# Patient Record
Sex: MALE | Race: WHITE | NOT HISPANIC OR LATINO | Employment: OTHER | URBAN - METROPOLITAN AREA
[De-identification: names, ages, dates, MRNs, and addresses within clinical notes are randomized per-mention and may not be internally consistent; named-entity substitution may affect disease eponyms.]

---

## 2023-05-09 ENCOUNTER — TELEPHONE (OUTPATIENT)
Dept: PRIMARY CARE | Facility: CLINIC | Age: 78
End: 2023-05-09

## 2023-05-09 DIAGNOSIS — E11.9 DIABETES MELLITUS TYPE 2 WITHOUT RETINOPATHY (MULTI): Primary | ICD-10-CM

## 2023-05-09 RX ORDER — METFORMIN HYDROCHLORIDE 500 MG/1
500 TABLET ORAL
Qty: 180 TABLET | Refills: 3 | Status: SHIPPED | OUTPATIENT
Start: 2023-05-09 | End: 2024-05-20

## 2023-05-09 RX ORDER — METFORMIN HYDROCHLORIDE 500 MG/1
500 TABLET ORAL
COMMUNITY
End: 2023-05-09 | Stop reason: SDUPTHER

## 2023-05-22 ENCOUNTER — OFFICE VISIT (OUTPATIENT)
Dept: PRIMARY CARE | Facility: CLINIC | Age: 78
End: 2023-05-22
Payer: MEDICARE

## 2023-05-22 VITALS
WEIGHT: 135.38 LBS | DIASTOLIC BLOOD PRESSURE: 80 MMHG | OXYGEN SATURATION: 94 % | HEIGHT: 72 IN | RESPIRATION RATE: 16 BRPM | TEMPERATURE: 98.4 F | BODY MASS INDEX: 18.34 KG/M2 | SYSTOLIC BLOOD PRESSURE: 135 MMHG | HEART RATE: 89 BPM

## 2023-05-22 DIAGNOSIS — Z00.00 ROUTINE GENERAL MEDICAL EXAMINATION AT A HEALTH CARE FACILITY: ICD-10-CM

## 2023-05-22 DIAGNOSIS — Z00.00 ROUTINE GENERAL MEDICAL EXAMINATION AT HEALTH CARE FACILITY: ICD-10-CM

## 2023-05-22 DIAGNOSIS — H90.3 BILATERAL SENSORINEURAL HEARING LOSS: ICD-10-CM

## 2023-05-22 DIAGNOSIS — E46 PROTEIN-CALORIE MALNUTRITION, UNSPECIFIED SEVERITY (MULTI): ICD-10-CM

## 2023-05-22 DIAGNOSIS — Z12.5 PROSTATE CANCER SCREENING: ICD-10-CM

## 2023-05-22 DIAGNOSIS — K86.1 IDIOPATHIC CHRONIC PANCREATITIS (MULTI): ICD-10-CM

## 2023-05-22 DIAGNOSIS — J44.9 CHRONIC OBSTRUCTIVE PULMONARY DISEASE, UNSPECIFIED COPD TYPE (MULTI): ICD-10-CM

## 2023-05-22 DIAGNOSIS — E11.9 DIABETES MELLITUS TYPE 2 WITHOUT RETINOPATHY (MULTI): Primary | ICD-10-CM

## 2023-05-22 DIAGNOSIS — Z87.891 FORMER SMOKER: ICD-10-CM

## 2023-05-22 DIAGNOSIS — G30.9 ALZHEIMER'S DISEASE, UNSPECIFIED (CODE) (MULTI): ICD-10-CM

## 2023-05-22 PROBLEM — I45.10 RIGHT BUNDLE BRANCH BLOCK (RBBB) ON ELECTROCARDIOGRAPHY: Status: RESOLVED | Noted: 2023-05-22 | Resolved: 2023-05-22

## 2023-05-22 PROBLEM — Z90.411 HISTORY OF PARTIAL PANCREATECTOMY: Status: RESOLVED | Noted: 2023-05-22 | Resolved: 2023-05-22

## 2023-05-22 PROBLEM — H25.13 NUCLEAR SCLEROSIS OF BOTH EYES: Status: ACTIVE | Noted: 2023-05-22

## 2023-05-22 PROBLEM — K85.91: Status: RESOLVED | Noted: 2023-05-22 | Resolved: 2023-05-22

## 2023-05-22 PROBLEM — Z90.49 S/P CHOLECYSTECTOMY: Status: ACTIVE | Noted: 2023-05-22

## 2023-05-22 PROBLEM — Z98.890 HISTORY OF LASER ASSISTED IN SITU KERATOMILEUSIS: Status: RESOLVED | Noted: 2023-05-22 | Resolved: 2023-05-22

## 2023-05-22 PROBLEM — H93.19 TINNITUS: Status: RESOLVED | Noted: 2023-05-22 | Resolved: 2023-05-22

## 2023-05-22 PROBLEM — H52.4 HYPEROPIA OF BOTH EYES WITH ASTIGMATISM AND PRESBYOPIA: Status: ACTIVE | Noted: 2023-05-22

## 2023-05-22 PROBLEM — R59.0 CERVICAL LYMPHADENOPATHY: Status: RESOLVED | Noted: 2023-05-22 | Resolved: 2023-05-22

## 2023-05-22 PROBLEM — E78.5 HYPERLIPIDEMIA: Status: ACTIVE | Noted: 2023-05-22

## 2023-05-22 PROBLEM — G45.9 TIA (TRANSIENT ISCHEMIC ATTACK): Status: ACTIVE | Noted: 2023-05-22

## 2023-05-22 PROBLEM — R41.3 MEMORY IMPAIRMENT OF GRADUAL ONSET: Status: ACTIVE | Noted: 2023-05-22

## 2023-05-22 PROBLEM — K63.5 COLON POLYP: Status: RESOLVED | Noted: 2023-05-22 | Resolved: 2023-05-22

## 2023-05-22 PROBLEM — N40.0 BPH (BENIGN PROSTATIC HYPERPLASIA): Status: ACTIVE | Noted: 2023-05-22

## 2023-05-22 PROBLEM — H52.03 HYPEROPIA OF BOTH EYES WITH ASTIGMATISM AND PRESBYOPIA: Status: ACTIVE | Noted: 2023-05-22

## 2023-05-22 PROBLEM — N52.9 ERECTILE DYSFUNCTION: Status: ACTIVE | Noted: 2023-05-22

## 2023-05-22 PROBLEM — L57.0 MULTIPLE ACTINIC KERATOSES: Status: RESOLVED | Noted: 2023-05-22 | Resolved: 2023-05-22

## 2023-05-22 PROBLEM — L20.81 ATOPIC NEURODERMATITIS: Status: ACTIVE | Noted: 2023-05-22

## 2023-05-22 PROBLEM — H25.013 CORTICAL AGE-RELATED CATARACT OF BOTH EYES: Status: ACTIVE | Noted: 2023-05-22

## 2023-05-22 PROBLEM — R91.8 LUNG MASS: Status: RESOLVED | Noted: 2023-05-22 | Resolved: 2023-05-22

## 2023-05-22 PROBLEM — H52.203 HYPEROPIA OF BOTH EYES WITH ASTIGMATISM AND PRESBYOPIA: Status: ACTIVE | Noted: 2023-05-22

## 2023-05-22 PROCEDURE — 1157F ADVNC CARE PLAN IN RCRD: CPT | Performed by: STUDENT IN AN ORGANIZED HEALTH CARE EDUCATION/TRAINING PROGRAM

## 2023-05-22 PROCEDURE — 1160F RVW MEDS BY RX/DR IN RCRD: CPT | Performed by: STUDENT IN AN ORGANIZED HEALTH CARE EDUCATION/TRAINING PROGRAM

## 2023-05-22 PROCEDURE — 1159F MED LIST DOCD IN RCRD: CPT | Performed by: STUDENT IN AN ORGANIZED HEALTH CARE EDUCATION/TRAINING PROGRAM

## 2023-05-22 PROCEDURE — 1170F FXNL STATUS ASSESSED: CPT | Performed by: STUDENT IN AN ORGANIZED HEALTH CARE EDUCATION/TRAINING PROGRAM

## 2023-05-22 PROCEDURE — 99213 OFFICE O/P EST LOW 20 MIN: CPT | Performed by: STUDENT IN AN ORGANIZED HEALTH CARE EDUCATION/TRAINING PROGRAM

## 2023-05-22 PROCEDURE — 1036F TOBACCO NON-USER: CPT | Performed by: STUDENT IN AN ORGANIZED HEALTH CARE EDUCATION/TRAINING PROGRAM

## 2023-05-22 PROCEDURE — G0439 PPPS, SUBSEQ VISIT: HCPCS | Performed by: STUDENT IN AN ORGANIZED HEALTH CARE EDUCATION/TRAINING PROGRAM

## 2023-05-22 RX ORDER — DONEPEZIL HYDROCHLORIDE 10 MG/1
10 TABLET, FILM COATED ORAL DAILY
COMMUNITY

## 2023-05-22 RX ORDER — ONDANSETRON 4 MG/1
4 TABLET, ORALLY DISINTEGRATING ORAL AS NEEDED
COMMUNITY
Start: 2022-11-29

## 2023-05-22 RX ORDER — TRIAMCINOLONE ACETONIDE 1 MG/G
0.1 CREAM TOPICAL 2 TIMES DAILY
COMMUNITY
Start: 2020-09-23

## 2023-05-22 RX ORDER — MEMANTINE HYDROCHLORIDE 10 MG/1
10 TABLET ORAL 2 TIMES DAILY
COMMUNITY

## 2023-05-22 RX ORDER — ALBUTEROL SULFATE 90 UG/1
2 AEROSOL, METERED RESPIRATORY (INHALATION) EVERY 4 HOURS PRN
COMMUNITY
Start: 2015-01-06

## 2023-05-22 RX ORDER — FLUTICASONE PROPIONATE AND SALMETEROL 250; 50 UG/1; UG/1
1 POWDER RESPIRATORY (INHALATION) EVERY 12 HOURS
COMMUNITY
End: 2024-05-15 | Stop reason: SDUPTHER

## 2023-05-22 SDOH — ECONOMIC STABILITY: INCOME INSECURITY: IN THE LAST 12 MONTHS, WAS THERE A TIME WHEN YOU WERE NOT ABLE TO PAY THE MORTGAGE OR RENT ON TIME?: NO

## 2023-05-22 SDOH — ECONOMIC STABILITY: TRANSPORTATION INSECURITY
IN THE PAST 12 MONTHS, HAS THE LACK OF TRANSPORTATION KEPT YOU FROM MEDICAL APPOINTMENTS OR FROM GETTING MEDICATIONS?: NO

## 2023-05-22 SDOH — ECONOMIC STABILITY: FOOD INSECURITY: WITHIN THE PAST 12 MONTHS, THE FOOD YOU BOUGHT JUST DIDN'T LAST AND YOU DIDN'T HAVE MONEY TO GET MORE.: NEVER TRUE

## 2023-05-22 SDOH — ECONOMIC STABILITY: HOUSING INSECURITY
IN THE LAST 12 MONTHS, WAS THERE A TIME WHEN YOU DID NOT HAVE A STEADY PLACE TO SLEEP OR SLEPT IN A SHELTER (INCLUDING NOW)?: NO

## 2023-05-22 SDOH — ECONOMIC STABILITY: FOOD INSECURITY: WITHIN THE PAST 12 MONTHS, YOU WORRIED THAT YOUR FOOD WOULD RUN OUT BEFORE YOU GOT MONEY TO BUY MORE.: NEVER TRUE

## 2023-05-22 SDOH — ECONOMIC STABILITY: TRANSPORTATION INSECURITY
IN THE PAST 12 MONTHS, HAS LACK OF TRANSPORTATION KEPT YOU FROM MEETINGS, WORK, OR FROM GETTING THINGS NEEDED FOR DAILY LIVING?: NO

## 2023-05-22 SDOH — HEALTH STABILITY: PHYSICAL HEALTH: ON AVERAGE, HOW MANY MINUTES DO YOU ENGAGE IN EXERCISE AT THIS LEVEL?: 0 MIN

## 2023-05-22 SDOH — HEALTH STABILITY: PHYSICAL HEALTH: ON AVERAGE, HOW MANY DAYS PER WEEK DO YOU ENGAGE IN MODERATE TO STRENUOUS EXERCISE (LIKE A BRISK WALK)?: 0 DAYS

## 2023-05-22 ASSESSMENT — SOCIAL DETERMINANTS OF HEALTH (SDOH)
IN A TYPICAL WEEK, HOW MANY TIMES DO YOU TALK ON THE PHONE WITH FAMILY, FRIENDS, OR NEIGHBORS?: NEVER
HOW OFTEN DO YOU ATTEND CHURCH OR RELIGIOUS SERVICES?: NEVER
DO YOU BELONG TO ANY CLUBS OR ORGANIZATIONS SUCH AS CHURCH GROUPS UNIONS, FRATERNAL OR ATHLETIC GROUPS, OR SCHOOL GROUPS?: NO
WITHIN THE LAST YEAR, HAVE TO BEEN RAPED OR FORCED TO HAVE ANY KIND OF SEXUAL ACTIVITY BY YOUR PARTNER OR EX-PARTNER?: NO
HOW OFTEN DO YOU ATTENT MEETINGS OF THE CLUB OR ORGANIZATION YOU BELONG TO?: NEVER
HOW HARD IS IT FOR YOU TO PAY FOR THE VERY BASICS LIKE FOOD, HOUSING, MEDICAL CARE, AND HEATING?: NOT HARD AT ALL
WITHIN THE LAST YEAR, HAVE YOU BEEN AFRAID OF YOUR PARTNER OR EX-PARTNER?: NO
WITHIN THE LAST YEAR, HAVE YOU BEEN HUMILIATED OR EMOTIONALLY ABUSED IN OTHER WAYS BY YOUR PARTNER OR EX-PARTNER?: NO
HOW OFTEN DO YOU GET TOGETHER WITH FRIENDS OR RELATIVES?: ONCE A WEEK
WITHIN THE LAST YEAR, HAVE YOU BEEN KICKED, HIT, SLAPPED, OR OTHERWISE PHYSICALLY HURT BY YOUR PARTNER OR EX-PARTNER?: NO

## 2023-05-22 ASSESSMENT — ENCOUNTER SYMPTOMS
DIZZINESS: 0
FEVER: 0
PALPITATIONS: 0
SHORTNESS OF BREATH: 0
FATIGUE: 0
DIARRHEA: 0
LOSS OF SENSATION IN FEET: 0
FLANK PAIN: 0
MYALGIAS: 0
NAUSEA: 0
LIGHT-HEADEDNESS: 0
HEMATURIA: 0
APPETITE CHANGE: 0
RHINORRHEA: 0
OCCASIONAL FEELINGS OF UNSTEADINESS: 0
VOMITING: 0
ARTHRALGIAS: 0
ABDOMINAL PAIN: 0
CONSTIPATION: 0
SINUS PAIN: 0
DEPRESSION: 0
BLOOD IN STOOL: 0

## 2023-05-22 ASSESSMENT — ACTIVITIES OF DAILY LIVING (ADL)
BATHING: INDEPENDENT
DOING_HOUSEWORK: NEEDS ASSISTANCE
GROCERY_SHOPPING: NEEDS ASSISTANCE
MANAGING_FINANCES: NEEDS ASSISTANCE
DRESSING: INDEPENDENT
TAKING_MEDICATION: NEEDS ASSISTANCE
GROCERY_SHOPPING: NEEDS ASSISTANCE
MANAGING_FINANCES: NEEDS ASSISTANCE
TAKING_MEDICATION: NEEDS ASSISTANCE
DOING_HOUSEWORK: NEEDS ASSISTANCE

## 2023-05-22 ASSESSMENT — LIFESTYLE VARIABLES
AUDIT-C TOTAL SCORE: 8
HOW OFTEN DO YOU HAVE A DRINK CONTAINING ALCOHOL: 4 OR MORE TIMES A WEEK
HOW OFTEN DO YOU HAVE SIX OR MORE DRINKS ON ONE OCCASION: DAILY OR ALMOST DAILY
HOW MANY STANDARD DRINKS CONTAINING ALCOHOL DO YOU HAVE ON A TYPICAL DAY: 1 OR 2
SKIP TO QUESTIONS 9-10: 0

## 2023-05-22 ASSESSMENT — PATIENT HEALTH QUESTIONNAIRE - PHQ9
SUM OF ALL RESPONSES TO PHQ9 QUESTIONS 1 AND 2: 0
2. FEELING DOWN, DEPRESSED OR HOPELESS: NOT AT ALL
1. LITTLE INTEREST OR PLEASURE IN DOING THINGS: NOT AT ALL

## 2023-05-22 NOTE — PROGRESS NOTES
Subjective   Sherman Sheffield is a 77 y.o. male who is here for a routine exam.  Active Problem List      Comprehensive Medical/Surgical/Social/Family History  Past Medical History:   Diagnosis Date    Cervical lymphadenopathy 05/22/2023    Chronic obstructive pulmonary disease, unspecified (CMS/HCC) 02/26/2021    COPD (chronic obstructive pulmonary disease)    Localized edema 02/10/2016    Bilateral leg edema    Muscle weakness (generalized)     Muscle weakness    Other conditions influencing health status     Pancreatitis    Other disorders of electrolyte and fluid balance, not elsewhere classified     Electrolyte and fluid disorder    Other malaise     Debility    Other nonspecific abnormal finding of lung field 09/13/2017    Lung mass    Personal history of other diseases of the digestive system     History of acute pancreatitis    Personal history of other diseases of the digestive system     History of esophageal reflux    Pleural effusion, not elsewhere classified     Bilateral pleural effusion    Prediabetes 06/03/2016    Prediabetes    Unspecified atrial flutter (CMS/HCC)     Atrial flutter    Unspecified injury of unspecified kidney, initial encounter     Kidney injury     Past Surgical History:   Procedure Laterality Date    CHOLECYSTECTOMY  10/21/2013    Cholecystectomy    OTHER SURGICAL HISTORY  10/21/2013    Biopsy Of The Prostate Incisional    OTHER SURGICAL HISTORY  02/10/2016    Closed Treatment Of A Subtrochanteric Fracture    OTHER SURGICAL HISTORY  02/10/2016    Pancreatic Surgery    US GUIDED ABSCESS DRAIN  3/19/2013    US GUIDED ABSCESS DRAIN 3/19/2013 [facility] [place] [facility]PATIENT LEGACY    US GUIDED ABSCESS DRAIN  4/2/2013    US GUIDED ABSCESS DRAIN 4/2/2013 [facility] [place] INPATIENT LEGACY    US GUIDED ABSCESS DRAIN  4/3/2013    US GUIDED ABSCESS DRAIN 4/3/2013 [facility] [place] INPATIENT LEGACY    US GUIDED PERCUTANEOUS PERITONEAL OR RETROPERITONEAL FLUID COLLECTION DRAINAGE  3/19/2013    US GUIDED PERCUTANEOUS PERITONEAL OR  RETROPERITONEAL FLUID COLLECTION DRAINAGE 3/19/2013 [facility] [place]    US GUIDED PERCUTANEOUS PERITONEAL OR RETROPERITONEAL FLUID COLLECTION DRAINAGE  4/2/2013    US GUIDED PERCUTANEOUS PERITONEAL OR RETROPERITONEAL FLUID COLLECTION DRAINAGE 4/2/2013 [facility] [place] [facility]     Social History     Social History Narrative    Not on file         Allergies and Medications  Patient has no known allergies.  Current Outpatient Medications on File Prior to Visit   Medication Sig Dispense Refill    albuterol 90 mcg/actuation inhaler Inhale 2 puffs every 4 hours if needed for shortness of breath.      donepezil (Aricept) 10 mg tablet Take 1 tablet (10 mg) by mouth once daily.      fluticasone propion-salmeteroL (Advair Diskus) 250-50 mcg/dose diskus inhaler Inhale 1 puff every 12 hours.      memantine (Namenda) 10 mg tablet Take 1 tablet (10 mg) by mouth 2 times a day.      metFORMIN (Glucophage) 500 mg tablet Take 1 tablet (500 mg) by mouth 2 times a day with meals. 180 tablet 3    ondansetron ODT (Zofran-ODT) 4 mg disintegrating tablet Take 1 tablet (4 mg) by mouth if needed for nausea.      triamcinolone (Kenalog) 0.1 % cream Apply 0.1 Applications topically 2 times a day.       No current facility-administered medications on file prior to visit.       Lifestyle  Diet: well balanced, avoids sugars  Exercise: No formal exercise, has plans to begin walking program  Tobacco: None currently, quit smoking 5 years ago  Alcohol:  One vodka drink per day    Colorectal Screening: Not indicated  Nocturia: Denies    Lung Ca screening:  Used to follow with pulmonology  Unsure if prior low dose CT has been completed    Review of Systems   Constitutional:  Negative for appetite change, fatigue and fever.   HENT:  Negative for ear discharge, ear pain, hearing loss, postnasal drip, rhinorrhea and sinus pain.    Eyes:  Negative for visual disturbance.   Respiratory:  Negative for shortness of breath.    Cardiovascular:  Negative for  "chest pain, palpitations and leg swelling.   Gastrointestinal:  Negative for abdominal pain, blood in stool, constipation, diarrhea, nausea and vomiting.   Genitourinary:  Negative for flank pain and hematuria.   Musculoskeletal:  Negative for arthralgias and myalgias.   Skin:  Negative for rash.   Neurological:  Negative for dizziness and light-headedness.   All other systems reviewed and are negative.      Objective   /80 (BP Location: Right arm, Patient Position: Sitting)   Pulse 89   Temp 36.9 °C (98.4 °F) (Temporal)   Resp 16   Ht 1.816 m (5' 11.5\")   Wt 61.4 kg (135 lb 6 oz)   SpO2 94%   BMI 18.62 kg/m²     Physical Exam  Vitals reviewed.   Constitutional:       General: He is not in acute distress.     Appearance: Normal appearance. He is normal weight. He is not toxic-appearing.   HENT:      Head: Normocephalic and atraumatic.      Right Ear: Tympanic membrane and ear canal normal.      Left Ear: Tympanic membrane and ear canal normal.      Nose: Nose normal. No congestion or rhinorrhea.      Mouth/Throat:      Mouth: Mucous membranes are dry.   Eyes:      General: No scleral icterus.     Extraocular Movements: Extraocular movements intact.      Conjunctiva/sclera: Conjunctivae normal.      Pupils: Pupils are equal, round, and reactive to light.   Cardiovascular:      Rate and Rhythm: Normal rate and regular rhythm.      Heart sounds: No murmur heard.     No friction rub. No gallop.   Pulmonary:      Effort: Pulmonary effort is normal. No respiratory distress.      Breath sounds: Normal breath sounds. No wheezing, rhonchi or rales.   Abdominal:      General: Abdomen is flat. There is no distension.      Palpations: Abdomen is soft.      Tenderness: There is no abdominal tenderness. There is no guarding.   Musculoskeletal:         General: Normal range of motion.      Cervical back: Normal range of motion and neck supple.      Right lower leg: No edema.      Left lower leg: No edema. "   Lymphadenopathy:      Cervical: No cervical adenopathy.   Skin:     General: Skin is warm and dry.   Neurological:      General: No focal deficit present.      Mental Status: He is alert and oriented to person, place, and time.   Psychiatric:         Mood and Affect: Mood normal.         Behavior: Behavior normal.         Assessment/Plan   Problem List Items Addressed This Visit          Nervous    Alzheimer's disease, unspecified (CODE) (CMS/[facility])       Respiratory    COPD (chronic obstructive pulmonary disease) (CMS/[facility])       Digestive    Chronic pancreatitis (CMS/[facility])       Endocrine/Metabolic    Diabetes mellitus type 2 without retinopathy (CMS/[facility]) - Primary    Relevant Orders    CBC    Comprehensive Metabolic Panel    Lipid Panel    Hemoglobin A1C    Protein-calorie malnutrition, unspecified severity (CMS/[facility])       Other    Former smoker    Routine general medical examination at a health care facility    Relevant Orders    CBC    Bilateral sensorineural hearing loss    Relevant Orders    Referral to Audiology     Other Visit Diagnoses       Prostate cancer screening        Relevant Orders    Prostate Spec.Ag,Screen    Routine general medical examination at health care facility              Reviewed Social Determinants of health with patient, discussed healthy lifestyle including 150 minutes of physical activity per week  Ordered/Reviewed baseline labwork -CBC, CMP, Lipid Panel  Immunizations Up-to-Date  Colonoscopy not indicated due to comorbidities    Continue follow-up with neurology, continue medications as prescribed  Discussed healthy lifestyle as above.    Follow-up in 1 year or sooner if new concerns arise.

## 2023-05-31 ENCOUNTER — LAB (OUTPATIENT)
Dept: LAB | Facility: LAB | Age: 78
End: 2023-05-31
Payer: MEDICARE

## 2023-05-31 DIAGNOSIS — Z00.00 ROUTINE GENERAL MEDICAL EXAMINATION AT A HEALTH CARE FACILITY: ICD-10-CM

## 2023-05-31 DIAGNOSIS — Z12.5 PROSTATE CANCER SCREENING: ICD-10-CM

## 2023-05-31 DIAGNOSIS — E11.9 DIABETES MELLITUS TYPE 2 WITHOUT RETINOPATHY (MULTI): ICD-10-CM

## 2023-05-31 LAB
ALANINE AMINOTRANSFERASE (SGPT) (U/L) IN SER/PLAS: 11 U/L (ref 10–52)
ALBUMIN (G/DL) IN SER/PLAS: 4.1 G/DL (ref 3.4–5)
ALKALINE PHOSPHATASE (U/L) IN SER/PLAS: 72 U/L (ref 33–136)
ANION GAP IN SER/PLAS: 12 MMOL/L (ref 10–20)
ASPARTATE AMINOTRANSFERASE (SGOT) (U/L) IN SER/PLAS: 16 U/L (ref 9–39)
BILIRUBIN TOTAL (MG/DL) IN SER/PLAS: 1.2 MG/DL (ref 0–1.2)
CALCIUM (MG/DL) IN SER/PLAS: 9.2 MG/DL (ref 8.6–10.3)
CARBON DIOXIDE, TOTAL (MMOL/L) IN SER/PLAS: 28 MMOL/L (ref 21–32)
CHLORIDE (MMOL/L) IN SER/PLAS: 105 MMOL/L (ref 98–107)
CHOLESTEROL (MG/DL) IN SER/PLAS: 145 MG/DL (ref 0–199)
CHOLESTEROL IN HDL (MG/DL) IN SER/PLAS: 45.7 MG/DL
CHOLESTEROL/HDL RATIO: 3.2
CREATININE (MG/DL) IN SER/PLAS: 0.98 MG/DL (ref 0.5–1.3)
ERYTHROCYTE DISTRIBUTION WIDTH (RATIO) BY AUTOMATED COUNT: 13.4 % (ref 11.5–14.5)
ERYTHROCYTE MEAN CORPUSCULAR HEMOGLOBIN CONCENTRATION (G/DL) BY AUTOMATED: 32.9 G/DL (ref 32–36)
ERYTHROCYTE MEAN CORPUSCULAR VOLUME (FL) BY AUTOMATED COUNT: 98 FL (ref 80–100)
ERYTHROCYTES (10*6/UL) IN BLOOD BY AUTOMATED COUNT: 4.25 X10E12/L (ref 4.5–5.9)
ESTIMATED AVERAGE GLUCOSE FOR HBA1C: 120 MG/DL
GFR MALE: 78 ML/MIN/1.73M2
GLUCOSE (MG/DL) IN SER/PLAS: 149 MG/DL (ref 74–99)
HEMATOCRIT (%) IN BLOOD BY AUTOMATED COUNT: 41.7 % (ref 41–52)
HEMOGLOBIN (G/DL) IN BLOOD: 13.7 G/DL (ref 13.5–17.5)
HEMOGLOBIN A1C/HEMOGLOBIN TOTAL IN BLOOD: 5.8 %
LDL: 84 MG/DL (ref 0–99)
LEUKOCYTES (10*3/UL) IN BLOOD BY AUTOMATED COUNT: 5.1 X10E9/L (ref 4.4–11.3)
PLATELETS (10*3/UL) IN BLOOD AUTOMATED COUNT: 182 X10E9/L (ref 150–450)
POTASSIUM (MMOL/L) IN SER/PLAS: 4 MMOL/L (ref 3.5–5.3)
PROSTATE SPECIFIC ANTIGEN,SCREEN: 0.81 NG/ML (ref 0–4)
PROTEIN TOTAL: 7 G/DL (ref 6.4–8.2)
SODIUM (MMOL/L) IN SER/PLAS: 141 MMOL/L (ref 136–145)
TRIGLYCERIDE (MG/DL) IN SER/PLAS: 75 MG/DL (ref 0–149)
UREA NITROGEN (MG/DL) IN SER/PLAS: 28 MG/DL (ref 6–23)
VLDL: 15 MG/DL (ref 0–40)

## 2023-05-31 PROCEDURE — 36415 COLL VENOUS BLD VENIPUNCTURE: CPT

## 2023-05-31 PROCEDURE — 80061 LIPID PANEL: CPT

## 2023-05-31 PROCEDURE — 84153 ASSAY OF PSA TOTAL: CPT

## 2023-05-31 PROCEDURE — 83036 HEMOGLOBIN GLYCOSYLATED A1C: CPT

## 2023-05-31 PROCEDURE — 80053 COMPREHEN METABOLIC PANEL: CPT

## 2023-05-31 PROCEDURE — 85027 COMPLETE CBC AUTOMATED: CPT

## 2023-06-01 ENCOUNTER — TELEPHONE (OUTPATIENT)
Dept: PRIMARY CARE | Facility: CLINIC | Age: 78
End: 2023-06-01

## 2023-06-01 NOTE — TELEPHONE ENCOUNTER
----- Message from Guanakito Kaur DO sent at 5/31/2023 10:52 PM EDT -----  Please add the hemoglobin A1c is 5.8%, significantly improved from previous which were all in the sevens.    No changes to medications at this time.    Follow-up as needed concerns arise.

## 2023-11-03 ENCOUNTER — APPOINTMENT (OUTPATIENT)
Dept: PULMONOLOGY | Facility: CLINIC | Age: 78
End: 2023-11-03

## 2024-01-11 ENCOUNTER — APPOINTMENT (OUTPATIENT)
Dept: PULMONOLOGY | Facility: CLINIC | Age: 79
End: 2024-01-11
Payer: MEDICARE

## 2024-02-22 ENCOUNTER — OFFICE VISIT (OUTPATIENT)
Dept: PULMONOLOGY | Facility: CLINIC | Age: 79
End: 2024-02-22
Payer: MEDICARE

## 2024-02-22 VITALS
WEIGHT: 139 LBS | HEART RATE: 67 BPM | DIASTOLIC BLOOD PRESSURE: 65 MMHG | HEIGHT: 72 IN | OXYGEN SATURATION: 96 % | TEMPERATURE: 97.9 F | SYSTOLIC BLOOD PRESSURE: 112 MMHG | RESPIRATION RATE: 16 BRPM | BODY MASS INDEX: 18.83 KG/M2

## 2024-02-22 DIAGNOSIS — J41.8 MIXED SIMPLE AND MUCOPURULENT CHRONIC BRONCHITIS (MULTI): Primary | ICD-10-CM

## 2024-02-22 DIAGNOSIS — R09.82 POST-NASAL DRIP: ICD-10-CM

## 2024-02-22 PROCEDURE — 99214 OFFICE O/P EST MOD 30 MIN: CPT | Performed by: INTERNAL MEDICINE

## 2024-02-22 PROCEDURE — 1157F ADVNC CARE PLAN IN RCRD: CPT | Performed by: INTERNAL MEDICINE

## 2024-02-22 PROCEDURE — 1036F TOBACCO NON-USER: CPT | Performed by: INTERNAL MEDICINE

## 2024-02-22 PROCEDURE — 1159F MED LIST DOCD IN RCRD: CPT | Performed by: INTERNAL MEDICINE

## 2024-02-22 RX ORDER — FLUTICASONE PROPIONATE 50 MCG
2 SPRAY, SUSPENSION (ML) NASAL DAILY
Qty: 16 G | Refills: 2 | Status: SHIPPED | OUTPATIENT
Start: 2024-02-22 | End: 2024-08-20

## 2024-02-22 ASSESSMENT — PATIENT HEALTH QUESTIONNAIRE - PHQ9
2. FEELING DOWN, DEPRESSED OR HOPELESS: NOT AT ALL
SUM OF ALL RESPONSES TO PHQ9 QUESTIONS 1 AND 2: 0
1. LITTLE INTEREST OR PLEASURE IN DOING THINGS: NOT AT ALL

## 2024-02-22 ASSESSMENT — ENCOUNTER SYMPTOMS
OCCASIONAL FEELINGS OF UNSTEADINESS: 0
DEPRESSION: 0
LOSS OF SENSATION IN FEET: 0

## 2024-02-22 ASSESSMENT — COLUMBIA-SUICIDE SEVERITY RATING SCALE - C-SSRS
2. HAVE YOU ACTUALLY HAD ANY THOUGHTS OF KILLING YOURSELF?: NO
6. HAVE YOU EVER DONE ANYTHING, STARTED TO DO ANYTHING, OR PREPARED TO DO ANYTHING TO END YOUR LIFE?: NO
1. IN THE PAST MONTH, HAVE YOU WISHED YOU WERE DEAD OR WISHED YOU COULD GO TO SLEEP AND NOT WAKE UP?: NO

## 2024-02-26 NOTE — PROGRESS NOTES
Department of Medicine  Division of Pulmonary, Critical Care, and Sleep Medicine  Follow-Up Visit  [facility] - [address], [address]    Physician HPI:  Mr Sheffield is an 78-year-old man with past medical history significant for nicotine dependence more than 60-pack-year history, severe COPD history of alcohol use and chronic pancreatitis who presented to clinic today for follow up.      Follow up 05/22/2019  No acute respiratory symptoms today. He has been using Advair once per day (again). No frequent need of Albuterol. He had a recent history of TIA and is following up with Neurology. Repeat chest imaging showed stable upper lobes linear scarring.      Follow up 11/11/2019  Sherman has no acute respiratory symptoms today. No interval hx of COPD exacerbations.He reports cigarette smoking about 1/2 PPD. He has been using Advair twice per day. He did not have to use ALbuterol frequently. He is planning to get flu vaccine this season.      Follow up 6/22/2020  Sherman reports stable respiratory status since last visit. He has chronic dyspnea on exertion that has not changed in the past 6 months. No acute cough or purulent sputum. Using Advair once per day again.     Follow up 02/22/2024:  Sherman presented to the clinic today accompanied by his wife. He was last seen in the office in 2019. Reports gradual decline in functional status and dyspnea on exertion over the past 2-3 years. No interval history of acute copd exacerbation  that required systemic corticosteroids or hospital admission. He is following up with Neurology for vascular dementia. Reports nasal dripping and frequent throat clearing. No acute fevers, purulent sputum, chest pain or hemoptysis.     Immunization History   Administered Date(s) Administered    Moderna SARS-CoV-2 Vaccination 03/01/2021, 03/29/2021, 12/01/2021       Current Outpatient Medications   Medication Instructions    albuterol 90 mcg/actuation inhaler 2 puffs, inhalation, Every 4 hours PRN     "donepezil (ARICEPT) 10 mg, oral, Daily    fluticasone (Flonase) 50 mcg/actuation nasal spray 2 sprays, Each Nostril, Daily, Shake gently. Before first use, prime pump. After use, clean tip and replace cap.    fluticasone propion-salmeteroL (Advair Diskus) 250-50 mcg/dose diskus inhaler 1 puff, inhalation, Every 12 hours    memantine (NAMENDA) 10 mg, oral, 2 times daily    metFORMIN (GLUCOPHAGE) 500 mg, oral, 2 times daily with meals    ondansetron ODT (ZOFRAN-ODT) 4 mg, oral, As needed    triamcinolone (Kenalog) 0.1 % cream 0.1 Applications, Topical, 2 times daily        Allergies:  No Known Allergies       Visit Vitals  /65 (BP Location: Left arm, Patient Position: Sitting, BP Cuff Size: Adult)   Pulse 67   Temp 36.6 °C (97.9 °F) (Temporal)   Resp 16   Ht 1.816 m (5' 11.5\")   Wt 63 kg (139 lb)   SpO2 96%   BMI 19.12 kg/m²   Smoking Status Former   BSA 1.78 m²        Physical Exam:  Constitutional: Alert and in no acute distress. Well developed, well nourished.   Head and Face: Head and face: Normal. Palpation of the face and sinuses: Normal.   Ears, Nose, Mouth, and Throat: Oropharynx: Normal.   Pulmonary: Chest: Normal to inspection. Respiratory effort: No increased work of breathing or signs of respiratory distress. Auscultation of lungs: Clear to auscultation bilaterally.   Cardiovascular: Heart rate and rhythm were normal, normal S1 and S2, no gallops, no murmurs and no pericardial rub. No peripheral edema.   Lymphatic: No lymphadenopathy.   Musculoskeletal: No clubbing or cyanosis of the fingernails.   Skin: Normal skin color and pigmentation, normal skin turgor, and no rash.   Psychiatric: Judgment and insight: Intact. Alert and oriented to person, place and time. Mood and affect: Normal.           Chest Radiograph     XR chest 2 views     Narrative  PERFORMED AT [facility] LOCATION:[facility]  Findings:    Osseous structures intact. Cardiopericardial silhouette normal. Pulmonary " vasculature  normal. Aorta calcified and tortuous. Lungs hyperexpanded with diaphragms flattened  and retrosternal clear space increase. Thin oblique band of increased opacity coursing  from right suprahilar region into right upper lobe.    Impression  Emphysema.    Right upper lobe scarring versus subsegmental atelectatic change.  Report reported and signed by Kamari Wood on 04/25/2023 1440      XR chest 1 view 11/28/2022    Narrative  STUDY:  Chest Radiograph;  11/28/2022 5:52 PM.    INDICATION:  Chest pain.    COMPARISON:  11/27/2019 CT Chest Low Dose For Lung, 04/03/2019 Chest PA/AP Lateral.    ACCESSION NUMBER(S):  [id number]    ORDERING CLINICIAN:  KP AMARAL DO    TECHNIQUE:  Frontal chest was obtained at 1848 hours.    FINDINGS:    Emphysematous changes.  RIGHT upper lobe subsegmental atelectasis  grossly unchanged from prior.  Nodule identified on 11/27/19 CT  examination RIGHT upper lobe is not readily apparent.  No pleural  effusion.  No pneumothorax.  Heart size normal.    Impression  Emphysematous changes.    RIGHT upper lobe subsegmental atelectasis grossly unchanged from  prior.      Signed by Raul Torrez MD      Echocardiogram     No results found for this or any previous visit from the past 365 days.       Chest CT Scan   CT LUNG SCREENING LOW DOSE 11/27/2019    Narrative  MRN: [id number]  Patient Name: YISSEL GAVIN    STUDY:   CT CHEST LOW DOSE FOR LUNG SCREENING WO CONTRAST; 11/27/2019 10:34  am    INDICATION:  lung cancer screening.    COMPARISON:  CT chest dated 09/08/2017    ACCESSION NUMBER(S):  [id number]    ORDERING CLINICIAN:  LAKE TALAMANTES    TECHNIQUE:  Helical data acquisition of the chest was obtained without IV  contrast material.  Images were reformatted in axial, coronal, and  sagittal planes.    FINDINGS:  LUNGS AND AIRWAYS:  The trachea and central airways are patent. No endobronchial lesion.  There is severe centrilobular emphysematous changes the  bilateral  lungs. There is apical pleuroparenchymal scarring. There is linear  densities in the right upper lobe with mild surrounding ground-glass  opacity measuring up to 4 mm in its greatest thickness (axial image  109/335), improved compared to CT dated 09/08/2017 and represent  scarring/fibrotic changes. Band like atelectasis/scarring in the  right middle, right lower lobe and lingular lobe. No focal  consolidation, pleural effusion or pneumothorax.  1 cm nodule in the right upper lobe (axial image 161/335) is stable  compared to CT dated 09/08/2017, likely benign. There are multiple  other nodules in the bilateral lungs, stable compared to CT dated  09/08/2017. For example, a bulky rachid density in the right upper  lobe (axial image 62/335), 4 mm perifissural nodule along the right  minor fissure (axial image 198/335), 5 mm nodule in the right middle  lobe (axial image 267/335), a 7 mm pleural-based nodular density in  the right lower lobe (axial image 209/335), 5 mm nodule in the right  lower lobe (axial image 226/335), and 3 mm subpleural nodule in the  left upper lobe (axial image 89/335). Other smaller stable punctate  nodular densities are annotated on PACS.    MEDIASTINUM AND JOSE ANGEL, LOWER NECK AND AXILLA:  The visualized thyroid gland is within normal limits.  No evidence of thoracic lymphadenopathy by CT criteria.  There is small hiatal hernia.    HEART AND VESSELS:  The thoracic aorta is of normal course and caliber with severe  vascular calcifications.  Main pulmonary artery and its branches are normal in caliber.  Severe coronary artery calcifications are present.The study is not  optimized for evaluation of coronary arteries.  The cardiac chambers are not enlarged.  Trace pericardial effusion is seen.    UPPER ABDOMEN:  4 cm area of hypodensity in the segment 4A of the liver is unchanged  compared to CT dated 09/08/2017. There is again postsurgical change  from pancreatic body resection. 2.4 cm  hypodense area in the  pancreatic tail is similar compared to CT dated 09/08/2017 and  decreased compared to CT dated 04/19/2017, which may represent  pseudocyst. Small amount of perisplenic fluid is again seen.    CHEST WALL AND OSSEOUS STRUCTURES:  There are no suspicious osseous lesions. Multilevel degenerative  changes are present  There is diffuse osteopenia.    Impression  1. Linear densities with architectural distortion in the right upper  lobe measuring 4 mm in its greatest thickness, improved compared to  CT dated 09/08/2017 and likely represent residual scarring/fibrotic  changes but attention on follow-up is recommended.  2. Stable nodules in the bilateral lungs measuring up to 1 cm.  Comment follow-up chest CT in 12 months.  3. Moderate to severe upper lung predominant emphysema.  4. Severe coronary artery calcifications.  5. Redemonstration of postsurgical change in the pancreatic body and  2.4 cm hypodense area in the pancreatic tail, may represent  pseudocyst.  6. Unchanged small amount of perisplenic fluid.  7. Stable hypodense area in the segment 4A of the liver      LUNG RADS CATEGORY:  Lung-RADS 2 S(Benign Appearance or Behavior): Continue with annual  screening in 12 months recommended as per American College of  Radiology Guidelines Lung-RADS Version 1.1S modifier is for severe  coronary artery disease, although this study is not optimized to  evaluate coronary artery.    I personally reviewed the images/study and I agree with the findings  as stated. This study was interpreted at [facility], [place], [place].        Laboratory Studies     Lab Results   Component Value Date    WBC 5.1 05/31/2023    HGB 13.7 05/31/2023    HCT 41.7 05/31/2023    MCV 98 05/31/2023     05/31/2023      Lab Results   Component Value Date    GLUCOSE 149 (H) 05/31/2023    CALCIUM 9.2 05/31/2023     05/31/2023    K 4.0 05/31/2023    CO2 28 05/31/2023     05/31/2023     BUN 28 (H) 05/31/2023    CREATININE 0.98 05/31/2023      Lab Results   Component Value Date    ALT 11 05/31/2023    AST 16 05/31/2023    ALKPHOS 72 05/31/2023    BILITOT 1.2 05/31/2023        Pulmonary Function Test         Assessment and Plan / Recommendations     Mr Whitaker is an 78-year-old man with:     1. COPD GOLD spirometry III with mMRC II  2. Nicotine dependence in remission   3. Lung scarring/atelectasis in upper lobes  4. Dyspnea on exertion  5. Dementia   6. Post nasal drip    Plan:  1. Repeat PFTs and 6MWT  2. Continue on current inhaler regimen  3. Fluticasone nasal spray for PND  4. Up to date with Influenza and Pneumococcal vaccines. Advised to get RSV and COVID boosters.   5. Pulmonary rehab - after PFTs are done    Follow up in 4 months.       Please excuse any misspellings or unintended errors related to the Dragon speech recognition software used to dictate this note.         Jaquelin Mcintyre MD  02/22/2024

## 2024-05-10 ENCOUNTER — HOSPITAL ENCOUNTER (OUTPATIENT)
Dept: RESPIRATORY THERAPY | Facility: HOSPITAL | Age: 79
Discharge: HOME | End: 2024-05-10
Payer: MEDICARE

## 2024-05-10 DIAGNOSIS — J41.8 MIXED SIMPLE AND MUCOPURULENT CHRONIC BRONCHITIS (MULTI): ICD-10-CM

## 2024-05-10 LAB
MGC ASCENT PFT - FEV1 - POST: 0.92
MGC ASCENT PFT - FEV1 - POST: 0.92
MGC ASCENT PFT - FEV1 - PRE: 0.72
MGC ASCENT PFT - FEV1 - PRE: 0.72
MGC ASCENT PFT - FEV1 - PREDICTED: 2.92
MGC ASCENT PFT - FEV1 - PREDICTED: 2.92
MGC ASCENT PFT - FVC - POST: 2.08
MGC ASCENT PFT - FVC - POST: 2.08
MGC ASCENT PFT - FVC - PRE: 1.65
MGC ASCENT PFT - FVC - PRE: 1.65
MGC ASCENT PFT - FVC - PREDICTED: 4.01
MGC ASCENT PFT - FVC - PREDICTED: 4.01

## 2024-05-10 PROCEDURE — 94726 PLETHYSMOGRAPHY LUNG VOLUMES: CPT

## 2024-05-10 PROCEDURE — 94618 PULMONARY STRESS TESTING: CPT

## 2024-05-15 ENCOUNTER — TELEPHONE (OUTPATIENT)
Dept: PULMONOLOGY | Facility: CLINIC | Age: 79
End: 2024-05-15
Payer: MEDICARE

## 2024-05-15 DIAGNOSIS — J41.8 MIXED SIMPLE AND MUCOPURULENT CHRONIC BRONCHITIS (MULTI): Primary | ICD-10-CM

## 2024-05-15 RX ORDER — FLUTICASONE PROPIONATE AND SALMETEROL 250; 50 UG/1; UG/1
1 POWDER RESPIRATORY (INHALATION) EVERY 12 HOURS
Qty: 60 EACH | Refills: 6 | Status: SHIPPED | OUTPATIENT
Start: 2024-05-15

## 2024-05-15 NOTE — TELEPHONE ENCOUNTER
Called pt back and left a detailed msg. On VM about below message.    Stated that to call the office if they have any furture questions.    No issues or concerns at moment.

## 2024-05-15 NOTE — TELEPHONE ENCOUNTER
Dr. Dale,    Pt wife called an stated that he got his PFT's testing done last week.    Asking for a refill on the fluticasone propion-salmeterol or unless if there is another inhaler you can suggest he start using after you review his PFT results.    Milly is [facility] in [facility].    Please advise.

## 2024-05-17 DIAGNOSIS — E11.9 DIABETES MELLITUS TYPE 2 WITHOUT RETINOPATHY (MULTI): Primary | ICD-10-CM

## 2024-05-20 RX ORDER — METFORMIN HYDROCHLORIDE 500 MG/1
TABLET ORAL
Qty: 180 TABLET | Refills: 0 | Status: SHIPPED | OUTPATIENT
Start: 2024-05-20

## 2024-06-03 ENCOUNTER — OFFICE VISIT (OUTPATIENT)
Dept: PRIMARY CARE | Facility: CLINIC | Age: 79
End: 2024-06-03
Payer: MEDICARE

## 2024-06-03 VITALS
TEMPERATURE: 98.6 F | DIASTOLIC BLOOD PRESSURE: 68 MMHG | HEIGHT: 71 IN | OXYGEN SATURATION: 93 % | HEART RATE: 80 BPM | RESPIRATION RATE: 16 BRPM | BODY MASS INDEX: 19.06 KG/M2 | SYSTOLIC BLOOD PRESSURE: 131 MMHG | WEIGHT: 136.13 LBS

## 2024-06-03 DIAGNOSIS — E55.9 VITAMIN D DEFICIENCY: ICD-10-CM

## 2024-06-03 DIAGNOSIS — E53.8 VITAMIN B12 DEFICIENCY: ICD-10-CM

## 2024-06-03 DIAGNOSIS — Z00.00 ROUTINE GENERAL MEDICAL EXAMINATION AT HEALTH CARE FACILITY: Primary | ICD-10-CM

## 2024-06-03 DIAGNOSIS — M75.22 BILATERAL BICEPS TENDONITIS: ICD-10-CM

## 2024-06-03 DIAGNOSIS — M16.0 BILATERAL HIP JOINT ARTHRITIS: ICD-10-CM

## 2024-06-03 DIAGNOSIS — M12.812 ROTATOR CUFF ARTHROPATHY OF BOTH SHOULDERS: ICD-10-CM

## 2024-06-03 DIAGNOSIS — E11.9 DIABETES MELLITUS TYPE 2 WITHOUT RETINOPATHY (MULTI): ICD-10-CM

## 2024-06-03 DIAGNOSIS — M75.21 BILATERAL BICEPS TENDONITIS: ICD-10-CM

## 2024-06-03 DIAGNOSIS — M12.811 ROTATOR CUFF ARTHROPATHY OF BOTH SHOULDERS: ICD-10-CM

## 2024-06-03 PROCEDURE — 1159F MED LIST DOCD IN RCRD: CPT | Performed by: STUDENT IN AN ORGANIZED HEALTH CARE EDUCATION/TRAINING PROGRAM

## 2024-06-03 PROCEDURE — 99397 PER PM REEVAL EST PAT 65+ YR: CPT | Performed by: STUDENT IN AN ORGANIZED HEALTH CARE EDUCATION/TRAINING PROGRAM

## 2024-06-03 PROCEDURE — 1036F TOBACCO NON-USER: CPT | Performed by: STUDENT IN AN ORGANIZED HEALTH CARE EDUCATION/TRAINING PROGRAM

## 2024-06-03 PROCEDURE — 1157F ADVNC CARE PLAN IN RCRD: CPT | Performed by: STUDENT IN AN ORGANIZED HEALTH CARE EDUCATION/TRAINING PROGRAM

## 2024-06-03 PROCEDURE — G0439 PPPS, SUBSEQ VISIT: HCPCS | Performed by: STUDENT IN AN ORGANIZED HEALTH CARE EDUCATION/TRAINING PROGRAM

## 2024-06-03 PROCEDURE — 1123F ACP DISCUSS/DSCN MKR DOCD: CPT | Performed by: STUDENT IN AN ORGANIZED HEALTH CARE EDUCATION/TRAINING PROGRAM

## 2024-06-03 PROCEDURE — 1170F FXNL STATUS ASSESSED: CPT | Performed by: STUDENT IN AN ORGANIZED HEALTH CARE EDUCATION/TRAINING PROGRAM

## 2024-06-03 PROCEDURE — 1160F RVW MEDS BY RX/DR IN RCRD: CPT | Performed by: STUDENT IN AN ORGANIZED HEALTH CARE EDUCATION/TRAINING PROGRAM

## 2024-06-03 PROCEDURE — 1158F ADVNC CARE PLAN TLK DOCD: CPT | Performed by: STUDENT IN AN ORGANIZED HEALTH CARE EDUCATION/TRAINING PROGRAM

## 2024-06-03 SDOH — ECONOMIC STABILITY: INCOME INSECURITY: IN THE LAST 12 MONTHS, WAS THERE A TIME WHEN YOU WERE NOT ABLE TO PAY THE MORTGAGE OR RENT ON TIME?: NO

## 2024-06-03 SDOH — ECONOMIC STABILITY: FOOD INSECURITY: WITHIN THE PAST 12 MONTHS, THE FOOD YOU BOUGHT JUST DIDN'T LAST AND YOU DIDN'T HAVE MONEY TO GET MORE.: NEVER TRUE

## 2024-06-03 SDOH — HEALTH STABILITY: PHYSICAL HEALTH: ON AVERAGE, HOW MANY DAYS PER WEEK DO YOU ENGAGE IN MODERATE TO STRENUOUS EXERCISE (LIKE A BRISK WALK)?: 0 DAYS

## 2024-06-03 SDOH — HEALTH STABILITY: PHYSICAL HEALTH: ON AVERAGE, HOW MANY MINUTES DO YOU ENGAGE IN EXERCISE AT THIS LEVEL?: 0 MIN

## 2024-06-03 SDOH — ECONOMIC STABILITY: GENERAL
WHICH OF THE FOLLOWING DO YOU KNOW HOW TO USE AND HAVE ACCESS TO EVERY DAY? (CHOOSE ALL THAT APPLY): PATIENT UNABLE TO ANSWER

## 2024-06-03 SDOH — ECONOMIC STABILITY: FOOD INSECURITY: WITHIN THE PAST 12 MONTHS, YOU WORRIED THAT YOUR FOOD WOULD RUN OUT BEFORE YOU GOT MONEY TO BUY MORE.: NEVER TRUE

## 2024-06-03 SDOH — ECONOMIC STABILITY: GENERAL
WHICH OF THE FOLLOWING WOULD YOU LIKE TO GET CONNECTED TO IN ORDER TO RECEIVE A DISCOUNT OR FOR FREE? (CHOOSE ALL THAT APPLY): NONE OF THESE

## 2024-06-03 ASSESSMENT — SOCIAL DETERMINANTS OF HEALTH (SDOH)
WITHIN THE LAST YEAR, HAVE TO BEEN RAPED OR FORCED TO HAVE ANY KIND OF SEXUAL ACTIVITY BY YOUR PARTNER OR EX-PARTNER?: NO
IN THE PAST 12 MONTHS, HAS THE ELECTRIC, GAS, OIL, OR WATER COMPANY THREATENED TO SHUT OFF SERVICE IN YOUR HOME?: NO
WITHIN THE LAST YEAR, HAVE YOU BEEN AFRAID OF YOUR PARTNER OR EX-PARTNER?: NO
HOW HARD IS IT FOR YOU TO PAY FOR THE VERY BASICS LIKE FOOD, HOUSING, MEDICAL CARE, AND HEATING?: NOT HARD AT ALL
WITHIN THE LAST YEAR, HAVE YOU BEEN HUMILIATED OR EMOTIONALLY ABUSED IN OTHER WAYS BY YOUR PARTNER OR EX-PARTNER?: NO
DO YOU BELONG TO ANY CLUBS OR ORGANIZATIONS SUCH AS CHURCH GROUPS UNIONS, FRATERNAL OR ATHLETIC GROUPS, OR SCHOOL GROUPS?: NO
WITHIN THE LAST YEAR, HAVE YOU BEEN KICKED, HIT, SLAPPED, OR OTHERWISE PHYSICALLY HURT BY YOUR PARTNER OR EX-PARTNER?: NO
HOW OFTEN DO YOU ATTENT MEETINGS OF THE CLUB OR ORGANIZATION YOU BELONG TO?: NEVER
IN A TYPICAL WEEK, HOW MANY TIMES DO YOU TALK ON THE PHONE WITH FAMILY, FRIENDS, OR NEIGHBORS?: NEVER
HOW OFTEN DO YOU ATTEND CHURCH OR RELIGIOUS SERVICES?: NEVER
HOW OFTEN DO YOU GET TOGETHER WITH FRIENDS OR RELATIVES?: ONCE A WEEK

## 2024-06-03 ASSESSMENT — LIFESTYLE VARIABLES
HOW MANY STANDARD DRINKS CONTAINING ALCOHOL DO YOU HAVE ON A TYPICAL DAY: 1 OR 2
HOW OFTEN DO YOU HAVE A DRINK CONTAINING ALCOHOL: 4 OR MORE TIMES A WEEK

## 2024-06-03 ASSESSMENT — ACTIVITIES OF DAILY LIVING (ADL)
BATHING: INDEPENDENT
DOING_HOUSEWORK: NEEDS ASSISTANCE
GROCERY_SHOPPING: TOTAL CARE
MANAGING_FINANCES: TOTAL CARE
DRESSING: INDEPENDENT
TAKING_MEDICATION: TOTAL CARE

## 2024-06-03 ASSESSMENT — ENCOUNTER SYMPTOMS
LIGHT-HEADEDNESS: 0
FATIGUE: 0
MYALGIAS: 0
DIZZINESS: 0
VOMITING: 0
PALPITATIONS: 0
APPETITE CHANGE: 0
DEPRESSION: 0
NAUSEA: 0
HEMATURIA: 0
FLANK PAIN: 0
OCCASIONAL FEELINGS OF UNSTEADINESS: 0
ABDOMINAL PAIN: 0
SHORTNESS OF BREATH: 0
CONSTIPATION: 0
FEVER: 0
RHINORRHEA: 0
LOSS OF SENSATION IN FEET: 0
BLOOD IN STOOL: 0
ARTHRALGIAS: 0
DIARRHEA: 0
SINUS PAIN: 0

## 2024-06-03 ASSESSMENT — PATIENT HEALTH QUESTIONNAIRE - PHQ9
2. FEELING DOWN, DEPRESSED OR HOPELESS: NOT AT ALL
1. LITTLE INTEREST OR PLEASURE IN DOING THINGS: NOT AT ALL
SUM OF ALL RESPONSES TO PHQ9 QUESTIONS 1 AND 2: 0

## 2024-06-03 NOTE — PROGRESS NOTES
Subjective   Sherman Sheffield is a 78 y.o. male who is here for a routine exam.  Active Problem List  Alzheimer's    Comprehensive Medical/Surgical/Social/Family History  Past Medical History:   Diagnosis Date    Cervical lymphadenopathy 05/22/2023    Chronic obstructive pulmonary disease, unspecified (Multi) 02/26/2021    COPD (chronic obstructive pulmonary disease)    Localized edema 02/10/2016    Bilateral leg edema    Muscle weakness (generalized)     Muscle weakness    Other conditions influencing health status     Pancreatitis    Other disorders of electrolyte and fluid balance, not elsewhere classified     Electrolyte and fluid disorder    Other malaise     Debility    Other nonspecific abnormal finding of lung field 09/13/2017    Lung mass    Personal history of other diseases of the digestive system     History of acute pancreatitis    Personal history of other diseases of the digestive system     History of esophageal reflux    Pleural effusion, not elsewhere classified     Bilateral pleural effusion    Prediabetes 06/03/2016    Prediabetes    Unspecified atrial flutter (Multi)     Atrial flutter    Unspecified injury of unspecified kidney, initial encounter     Kidney injury     Past Surgical History:   Procedure Laterality Date    CHOLECYSTECTOMY  10/21/2013    Cholecystectomy    OTHER SURGICAL HISTORY  10/21/2013    Biopsy Of The Prostate Incisional    OTHER SURGICAL HISTORY  02/10/2016    Closed Treatment Of A Subtrochanteric Fracture    OTHER SURGICAL HISTORY  02/10/2016    Pancreatic Surgery    US GUIDED ABSCESS DRAIN  3/19/2013    [place]UIDED [place]ESS D[place] [facility] [place]    US GUIDED ABSCESS DRAIN  4/2/2013    US GUIDED ABSCESS DRAIN [place] [facility] [place]    US GUIDED ABSCESS DRAIN  4/3/2013    US GUIDED ABSCESS DRAIN [place] [facility] [place]    US GUIDED PERCUTANEOUS PERITONEAL OR RETROPERITONEAL FLUID COLLECTION DRAINAGE  3/19/2013    US GUIDED PERCUTANEOUS PERITONEAL OR  RETROPERITONEAL FLUID COLLECTION DRAINAGE 3/19/2013 [facility] [place] [facility]    US GUIDED PERCUTANEOUS PERITONEAL OR RETROPERITONEAL FLUID COLLECTION DRAINAGE  4/2/2013    US GUIDED PERCUTANEOUS PERITONEAL OR RETROPERITONEAL FLUID COLLECTION DRAINAGE 4/2/2013 [facility] [place] [facility]     Social History     Social History Narrative    Not on file       Allergies and Medications  Patient has no known allergies.  Current Outpatient Medications on File Prior to Visit   Medication Sig Dispense Refill    donepezil (Aricept) 10 mg tablet Take 1 tablet (10 mg) by mouth once daily.      fluticasone propion-salmeteroL (Advair Diskus) 250-50 mcg/dose diskus inhaler Inhale 1 puff every 12 hours. 60 each 6    memantine (Namenda) 10 mg tablet Take 1 tablet (10 mg) by mouth 2 times a day.      metFORMIN (Glucophage) 500 mg tablet TAKE ONE TABLET(500 mg) BY MOUTH TWO TIMES A DAY with meals. 180 tablet 0    albuterol 90 mcg/actuation inhaler Inhale 2 puffs every 4 hours if needed for shortness of breath.      fluticasone (Flonase) 50 mcg/actuation nasal spray Administer 2 sprays into each nostril once daily. Shake gently. Before first use, prime pump. After use, clean tip and replace cap. (Patient not taking: Reported on 6/3/2024) 16 g 2    ondansetron ODT (Zofran-ODT) 4 mg disintegrating tablet Take 1 tablet (4 mg) by mouth if needed for nausea.      triamcinolone (Kenalog) 0.1 % cream Apply 0.1 Applications topically 2 times a day.       No current facility-administered medications on file prior to visit.       Concerns today:  New joint pain in shoulders and hips. Started about 1.5 months ago relatively suddenly, although he doesn't always report it reliably and has poor memory. Achy and stiff at times, but sharp at other times. Can be very severe. Worse in the morning, can last for hours. Improves with ibuprofen. No injuries or recent changes in physical activity. Former  and golfer.     Lifestyle  Diet: Healthy and Well  "Balanced  Physical Activity: Inactive (6/3/2024)    Exercise Vital Sign     Days of Exercise per Week: 0 days     Minutes of Exercise per Session: 0 min      Tobacco Use: Medium Risk (2/22/2024)    Patient History     Smoking Tobacco Use: Former     Smokeless Tobacco Use: Never     Passive Exposure: Not on file      Alcohol Use: Unknown (6/3/2024)    AUDIT-C     Frequency of Alcohol Consumption: 4 or more times a week     Average Number of Drinks: 1 or 2     Frequency of Binge Drinking: Not on file      Depression: Not at risk (6/3/2024)    PHQ-2     PHQ-2 Score: 0      Stress: No Stress Concern Present (6/3/2024)    [employer] - Occupational Stress Questionnaire     Feeling of Stress : Not at all       Consultants:  Neurology for dementia, currently on Aricept and Namenda      Nocturia: .Sparse  Erectile dysfunction: .Did not discuss    Review of Systems   Constitutional:  Negative for appetite change, fatigue and fever.   HENT:  Negative for ear discharge, ear pain, hearing loss, postnasal drip, rhinorrhea and sinus pain.    Eyes:  Negative for visual disturbance.   Respiratory:  Negative for shortness of breath.    Cardiovascular:  Negative for chest pain, palpitations and leg swelling.   Gastrointestinal:  Negative for abdominal pain, blood in stool, constipation, diarrhea, nausea and vomiting.   Genitourinary:  Negative for flank pain and hematuria.   Musculoskeletal:  Negative for arthralgias and myalgias.   Skin:  Negative for rash.   Neurological:  Negative for dizziness and light-headedness.   All other systems reviewed and are negative.      Objective   /68 (BP Location: Right arm, Patient Position: Sitting)   Pulse 80   Temp 37 °C (98.6 °F) (Temporal)   Resp 16   Ht 1.803 m (5' 11\")   Wt 61.7 kg (136 lb 2 oz)   SpO2 93%   BMI 18.99 kg/m²     Physical Exam  Vitals reviewed.   Constitutional:       General: He is not in acute distress.     Appearance: Normal " appearance. He is normal weight. He is not toxic-appearing.   HENT:      Head: Normocephalic and atraumatic.      Right Ear: Tympanic membrane and ear canal normal.      Left Ear: Tympanic membrane and ear canal normal.      Nose: Nose normal. No congestion or rhinorrhea.      Mouth/Throat:      Mouth: Mucous membranes are dry.   Eyes:      General: No scleral icterus.     Extraocular Movements: Extraocular movements intact.      Conjunctiva/sclera: Conjunctivae normal.      Pupils: Pupils are equal, round, and reactive to light.   Cardiovascular:      Rate and Rhythm: Normal rate and regular rhythm.      Pulses: Normal pulses.      Heart sounds: Normal heart sounds. No murmur heard.     No friction rub. No gallop.   Pulmonary:      Effort: Pulmonary effort is normal. No respiratory distress.      Breath sounds: Normal breath sounds. No wheezing, rhonchi or rales.   Abdominal:      General: Abdomen is flat. There is no distension.      Palpations: Abdomen is soft.      Tenderness: There is no abdominal tenderness. There is no guarding.   Musculoskeletal:      Right shoulder: Tenderness and bony tenderness (bicepital groove) present. No swelling, deformity or effusion. Decreased range of motion (active & passive flexion, abduction, internal rotation; worse than L). Normal strength. Normal pulse.      Left shoulder: Tenderness (bicepital groove) and bony tenderness present. No swelling, deformity or effusion. Decreased range of motion (active & passive flexion, abduction, internal rotation). Normal strength. Normal pulse.      Cervical back: Normal range of motion and neck supple.      Right hip: No tenderness or bony tenderness. Decreased range of motion (internal rotation). Normal strength.      Left hip: No tenderness or bony tenderness. Decreased range of motion (internal rotation). Normal strength.      Right lower leg: No edema.      Left lower leg: No edema.      Comments: R shoulder: Positive Neer impingement,  empty can test, Speed's test.  Negative Velez Lex test.  L shoulder: Positive empty can test, Speed's test. Negative Neer, Velez Lex test.  R hip: Negative logroll, MARLI/FADIR, hip scour test.  L hip: Negative logroll, MARLI/FADIR, hip scour test.   Lymphadenopathy:      Cervical: No cervical adenopathy.   Skin:     General: Skin is warm and dry.   Neurological:      General: No focal deficit present.      Mental Status: He is alert and oriented to person, place, and time. Mental status is at baseline.      Comments: Forgetful.   Psychiatric:         Mood and Affect: Mood normal.         Behavior: Behavior normal.         Assessment/Plan   Problem List Items Addressed This Visit       Diabetes mellitus type 2 without retinopathy (Multi)    Relevant Orders    Comprehensive Metabolic Panel    CBC    Lipid Panel    Vitamin B12    Hemoglobin A1C     Other Visit Diagnoses       Routine general medical examination at health care facility    -  Primary    Relevant Orders    Comprehensive Metabolic Panel    CBC    Lipid Panel    Rotator cuff arthropathy of both shoulders        Relevant Orders    Referral to Physical Therapy    Bilateral biceps tendonitis        Bilateral hip joint arthritis        Relevant Orders    Referral to Physical Therapy    Vitamin D deficiency        Relevant Orders    Vitamin D 25-Hydroxy,Total (for eval of Vitamin D levels)    Vitamin B12 deficiency        Relevant Orders    Vitamin B12          Reviewed Social Determinants of health with patient, discussed healthy lifestyle including 150 minutes of physical activity per week  Ordered/Reviewed baseline labwork -CBC, CMP, Lipid Panel  Immunizations: Up To Date    Will add vitamin D and vitamin B12 for decreased oral intake.    Continue follow-up with neurology for evaluation and treatment of dementia.    We will refer to physical therapy for multiple joint aches and pains as well as to increase regular physical activity.  They were  agreeable consider home therapy if this is unobtainable.    For shoulder pain and hip pain consider corticosteroid injections for quality of life improvements if unable to continue with physical therapy.    Will call with routine lab work, follow-up as new concerns arise.

## 2024-06-05 ENCOUNTER — LAB (OUTPATIENT)
Dept: LAB | Facility: LAB | Age: 79
End: 2024-06-05
Payer: MEDICARE

## 2024-06-05 DIAGNOSIS — E11.9 DIABETES MELLITUS TYPE 2 WITHOUT RETINOPATHY (MULTI): ICD-10-CM

## 2024-06-05 DIAGNOSIS — E55.9 VITAMIN D DEFICIENCY: ICD-10-CM

## 2024-06-05 DIAGNOSIS — E53.8 VITAMIN B12 DEFICIENCY: ICD-10-CM

## 2024-06-05 DIAGNOSIS — Z00.00 ROUTINE GENERAL MEDICAL EXAMINATION AT HEALTH CARE FACILITY: ICD-10-CM

## 2024-06-05 LAB
25(OH)D3 SERPL-MCNC: 24 NG/ML (ref 30–100)
ALBUMIN SERPL BCP-MCNC: 3.8 G/DL (ref 3.4–5)
ALP SERPL-CCNC: 72 U/L (ref 33–136)
ALT SERPL W P-5'-P-CCNC: 9 U/L (ref 10–52)
ANION GAP SERPL CALC-SCNC: 15 MMOL/L (ref 10–20)
AST SERPL W P-5'-P-CCNC: 14 U/L (ref 9–39)
BILIRUB SERPL-MCNC: 0.7 MG/DL (ref 0–1.2)
BUN SERPL-MCNC: 26 MG/DL (ref 6–23)
CALCIUM SERPL-MCNC: 9.3 MG/DL (ref 8.6–10.3)
CHLORIDE SERPL-SCNC: 105 MMOL/L (ref 98–107)
CHOLEST SERPL-MCNC: 131 MG/DL (ref 0–199)
CHOLESTEROL/HDL RATIO: 2.7
CO2 SERPL-SCNC: 25 MMOL/L (ref 21–32)
CREAT SERPL-MCNC: 0.97 MG/DL (ref 0.5–1.3)
EGFRCR SERPLBLD CKD-EPI 2021: 79 ML/MIN/1.73M*2
ERYTHROCYTE [DISTWIDTH] IN BLOOD BY AUTOMATED COUNT: 13.3 % (ref 11.5–14.5)
EST. AVERAGE GLUCOSE BLD GHB EST-MCNC: 137 MG/DL
GLUCOSE SERPL-MCNC: 134 MG/DL (ref 74–99)
HBA1C MFR BLD: 6.4 %
HCT VFR BLD AUTO: 42.1 % (ref 41–52)
HDLC SERPL-MCNC: 47.7 MG/DL
HGB BLD-MCNC: 13.5 G/DL (ref 13.5–17.5)
LDLC SERPL CALC-MCNC: 71 MG/DL
MCH RBC QN AUTO: 31 PG (ref 26–34)
MCHC RBC AUTO-ENTMCNC: 32.1 G/DL (ref 32–36)
MCV RBC AUTO: 97 FL (ref 80–100)
NON HDL CHOLESTEROL: 83 MG/DL (ref 0–149)
NRBC BLD-RTO: 0 /100 WBCS (ref 0–0)
PLATELET # BLD AUTO: 242 X10*3/UL (ref 150–450)
POTASSIUM SERPL-SCNC: 4.6 MMOL/L (ref 3.5–5.3)
PROT SERPL-MCNC: 7 G/DL (ref 6.4–8.2)
RBC # BLD AUTO: 4.36 X10*6/UL (ref 4.5–5.9)
SODIUM SERPL-SCNC: 140 MMOL/L (ref 136–145)
TRIGL SERPL-MCNC: 64 MG/DL (ref 0–149)
VIT B12 SERPL-MCNC: 152 PG/ML (ref 211–911)
VLDL: 13 MG/DL (ref 0–40)
WBC # BLD AUTO: 7.3 X10*3/UL (ref 4.4–11.3)

## 2024-06-05 PROCEDURE — 83036 HEMOGLOBIN GLYCOSYLATED A1C: CPT

## 2024-06-05 PROCEDURE — 80061 LIPID PANEL: CPT

## 2024-06-05 PROCEDURE — 80053 COMPREHEN METABOLIC PANEL: CPT

## 2024-06-05 PROCEDURE — 36415 COLL VENOUS BLD VENIPUNCTURE: CPT

## 2024-06-05 PROCEDURE — 82306 VITAMIN D 25 HYDROXY: CPT

## 2024-06-05 PROCEDURE — 85027 COMPLETE CBC AUTOMATED: CPT

## 2024-06-05 PROCEDURE — 82607 VITAMIN B-12: CPT

## 2024-06-14 ENCOUNTER — TELEPHONE (OUTPATIENT)
Dept: PULMONOLOGY | Facility: CLINIC | Age: 79
End: 2024-06-14
Payer: MEDICARE

## 2024-06-17 ENCOUNTER — APPOINTMENT (OUTPATIENT)
Dept: PHYSICAL THERAPY | Facility: CLINIC | Age: 79
End: 2024-06-17
Payer: MEDICARE

## 2024-06-26 ENCOUNTER — EVALUATION (OUTPATIENT)
Dept: PHYSICAL THERAPY | Facility: CLINIC | Age: 79
End: 2024-06-26
Payer: MEDICARE

## 2024-06-26 DIAGNOSIS — M16.0 BILATERAL HIP JOINT ARTHRITIS: ICD-10-CM

## 2024-06-26 DIAGNOSIS — M12.812 ROTATOR CUFF ARTHROPATHY OF BOTH SHOULDERS: ICD-10-CM

## 2024-06-26 DIAGNOSIS — M12.811 ROTATOR CUFF ARTHROPATHY OF BOTH SHOULDERS: ICD-10-CM

## 2024-06-26 PROCEDURE — 97140 MANUAL THERAPY 1/> REGIONS: CPT | Mod: GP | Performed by: PHYSICAL THERAPIST

## 2024-06-26 PROCEDURE — 97161 PT EVAL LOW COMPLEX 20 MIN: CPT | Mod: GP | Performed by: PHYSICAL THERAPIST

## 2024-06-26 ASSESSMENT — ENCOUNTER SYMPTOMS
OCCASIONAL FEELINGS OF UNSTEADINESS: 0
LOSS OF SENSATION IN FEET: 0
DEPRESSION: 0

## 2024-06-26 ASSESSMENT — PAIN - FUNCTIONAL ASSESSMENT: PAIN_FUNCTIONAL_ASSESSMENT: 0-10

## 2024-06-26 NOTE — PROGRESS NOTES
Physical Therapy Evaluation    Patient Name: Sherman Sheffield  MRN: [id number]  Time Calculation  Start Time: 1545  Stop Time: 1620  Time Calculation (min): 35 min  PT Evaluation Time Entry  PT Evaluation (Low) Time Entry: 15  PT Therapeutic Procedures Time Entry  Manual Therapy Time Entry: 8  Therapeutic Exercise Time Entry: 7  Therapeutic Activity Time Entry: 5                   Current Problem  1. Rotator cuff arthropathy of both shoulders  Referral to Physical Therapy    Follow Up In Physical Therapy      2. Bilateral hip joint arthritis  Referral to Physical Therapy        Insurance  [facility] MEDICARE  BMN PT OT COPAY 35 DED 0 COVERAGE 80  OOP [phone](160) NO AUTH REQ  REF# [id number]   Insurance reviewed   Visit number: 1  Approved number of visits: BMN        Subjective   General:  Patient is a 78 year old male  presenting with complaints of bilateral shoulder pain, with the right being the worst. He presents with his caregiver. This has been going on for about 2 months now. He denies any specific incident. He will notice R shoulder stiffness in the morning, that will get better through the day. Aggravating activities include reaching overhead. He will feel stiffness and an ache along the top and outside of his R shoulder. Relieving activities include gentle movement. He denies any consistent popping or clicking. He denies that the should wakes him up through the night. He has not received any treatment to date for this issue. He is currently retired. He reports mild R side neck tightness, but denies any radiating pain/numbness/tingling into R arm. The discomfort is localized to the lateral aspect of the R shoulder   Precautions:    Pain:  Mild stiffness at rest, moderate discomfort with reaching activities, sometimes severe stiffness in the morning  Reviewed medical screening form with pt and medical screening assessed    Imaging:   No shoulder imaging    Objective   Observation-  unremarkable      Cervical AROM  Flexion:  70 degrees  Extension:  50 degrees  Lateral Flexion: L 45  degrees; R  35  degrees  Rotation: L 65  degrees; R 65  degrees        Shoulder PROM   Flexion: L 150  degrees ; R  140  degrees, ERP perry  ABD: L 140  degrees ; R  130  degrees, ERP perry  ER at 90 degrees abduction: L 80  degrees ; R  75  degrees  IR at 90 degrees abduction: L  55  degrees: R  35  degrees    Shoulder AROM   Flexion: L 130  degrees ; R  120  degrees. ERP  ABD: L 130  degrees; R  120  degrees, ERP  Functional IR: R- L3 L- L1     Shoulder MMT   Flex: L-  5/5 ; R-    4/5, pain  ABD: L-  5/5 ; R-   4/5, pain  IR at 0 degrees abduction: L-  5/5; R-   5/5  ER at 0 degrees abduction: L-   5/5; R-   4+/5          GH Joint: 2/6 posterior and inferior glide bilaterally            Agustin-Lex: Negative bilaterally  Empty Can: Negative bilaterally    Drop Arm Test:  Negative bilaterally  Painful Arc: Negative bilaterally           Outcome Measures:  Other Measures  Disability of Arm Shoulder Hand (DASH): 20  Lower Extremity Funtional Score (LEFS): 48     Treatment:   -Patient education regarding recovery timeline, rehabilitation process and rehabilitation timeline, home exercise program demonstration and construction, review of precautions, and long term strategies for maximize functional capacity and functional independence   -R GH joint PROM into flexion to tolerance 2x10  -R GH joint mobilizations 5 minutes  -R GH joint IR CR stretch 3x30-6  -Supine Shoulder flexion AAROM 1x12  EDUCATION/HEP:  Supine shoulder flexion AAROM 2x10 each day  Assessment:  Patient is a 78 year old male  presenting with bilateral  shoulder pain and stiffness, R worse than L. Patient presents with the following primary physical and functional impairments and limitations:  bilateral limitations and end range discomfort with shoulder flexion, abduction AROM, limitations with R shoulder IR PROM at 90 degrees abduction, R shoulder  weakness and pain with resisted flexion and abduction, bilateral GH joint hypomobility and consistent morning stiffness through R shoulder.  Patient is displaying good prognosis for physical therapy care based upon subjective and objective assessment. Patient will benefit from skilled intervention to address the above mentioned impairments to maximize functional capacity and quality of life. Patient appeared to understand all education provided. Patient is displaying good prognosis for physical therapy care based upon subjective and objective assessment.       Physical Therapy Problem List  Limitations with R shoulder elevation  2.   R shoulder weakness/pain with flexion, abduction         Clinical Presentation: Stable   Level of Complexity: Low   Goals:    Pt will report at least 75% improvement in  shoulder pain during everyday activities.  Pt will demo >/= 4+/5 strength of  shoulder musculature in all major planes without pain.  Pt will demo full and symmetrical AROM of cervical spine without pain.  Pt will demo symmetrical A/PROM of shoulder without pain.  Pt will display symmetrical capacity bilaterally for reaching behind back and reaching behind head  Pt will improve Quick DASH score by at least 20% (MCID) to indicate a significant improvement in overall function.   Pt will demo independence and report compliance with HEP.   Plan  1x/week for 6 visits     Skilled therapeutic intervention to address the above mentioned physical and functional impairments and limitations including, but not limited to: patient education, therapeutic exercise, therapeutic activity, manual therapy, body mechanics training, dry needling, blood flow restriction training, instrumented soft tissue mobilization, manual soft tissue mobilization, gait retraining, biofeedback, cryotherapy, electrical stimulation, home program development, hot pack, taping, neuromuscular re-education, self-care/home management, and vasopneumatic  compression.

## 2024-07-01 ENCOUNTER — APPOINTMENT (OUTPATIENT)
Dept: PULMONOLOGY | Facility: CLINIC | Age: 79
End: 2024-07-01
Payer: MEDICARE

## 2024-07-10 ENCOUNTER — APPOINTMENT (OUTPATIENT)
Dept: PHYSICAL THERAPY | Facility: CLINIC | Age: 79
End: 2024-07-10
Payer: MEDICARE

## 2024-07-12 ENCOUNTER — TREATMENT (OUTPATIENT)
Dept: PHYSICAL THERAPY | Facility: CLINIC | Age: 79
End: 2024-07-12
Payer: MEDICARE

## 2024-07-12 DIAGNOSIS — M12.811 ROTATOR CUFF ARTHROPATHY OF BOTH SHOULDERS: Primary | ICD-10-CM

## 2024-07-12 DIAGNOSIS — M12.812 ROTATOR CUFF ARTHROPATHY OF BOTH SHOULDERS: Primary | ICD-10-CM

## 2024-07-12 PROCEDURE — 97110 THERAPEUTIC EXERCISES: CPT | Mod: GP | Performed by: PHYSICAL THERAPIST

## 2024-07-12 PROCEDURE — 97140 MANUAL THERAPY 1/> REGIONS: CPT | Mod: GP | Performed by: PHYSICAL THERAPIST

## 2024-07-12 NOTE — PROGRESS NOTES
"Physical Therapy Treatment    Patient Name: Sherman Sheffield  MRN: [id number]  Time Calculation  Start Time: 1245  Stop Time: 1324  Time Calculation (min): 39 min     PT Therapeutic Procedures Time Entry  Manual Therapy Time Entry: 15  Therapeutic Exercise Time Entry: 24                 Insurance  [employer] MEDICARE  BMN PT OT COPAY 35 DED 0 COVERAGE 80  OOP 3700(160) NO AUTH REQ  REF# [id number]   Insurance reviewed   Visit number: 1  Approved number of visits: BMN  Current Problem  1. Rotator cuff arthropathy of both shoulders            Subjective   General  Patient presents with his caregiver. He reports that he has been practicing the HEP items most nights. He is still waking up with stiffness through his R shoulder, but this seems to be to reduced levels.   Precautions    Pain  3-4/10    Objective   Shoulder Flexion AROM: 145 degrees pain (152 degrees following treatment)  Shoulder Flexion PROM: 150 degrees, ERP  Shoulder Abduction AROM: R- 115 degrees pain  Treatments:  -L shoulder PROM into flexion, ER, IR and abduction, D2 flexion 8 minutes  -L Shoulder posterior, inferior, circumduction, and vibrational mobilizations, 8 minutes  -L GH joint ER and IR isometrics, 45 degrees and 90 degrees abduction: 8-3\" holds each  -STM through R lateral cervical and posterior cervical musculature  -R to L segmental sidegliding C3-7  -Rhythmic isometrics  -Supine shoulder flexion AAROM 1lbs 2x8-12  -Sidelying shoulder abduction 2x8-10 assisted  -Seated thoracic rotation AROM with therapist OP 2x5 bilaterally  OP EDUCATION/HEP:      Assessment   Patient tolerated treatment well. Patient displayed improvement in R shoulder flexion AROM following treatment. While he is displaying mostly symmetrical R shoulder flexion AROM compared to L, he is reporting end range stiffness and discomfort on the R. He did report end range R shoulder stiffness with PROM and AROM R shoulder flexion based activities, but this " remained within subjectively tolerable ranges. He is continuing to display generalized R GH joint stiffness.  Primary focuses of treatment moving forward should include: progressive R shoulder mobility training within symptom tolerance, along with strengthening into positions of elevation to improved functional reaching capacity of R UE. Patient appeared to understand all education provided. Will continue to benefit from skilled intervention to address the above mentioned impairments and limitations.       Physical Therapy Problem List  Limitations with R shoulder elevation  2.   R shoulder weakness/pain with flexion, abduction  Plan   -Continue to maximize R GH joint PROM and AROM in all planes within symptom tolerance

## 2024-07-17 ENCOUNTER — TREATMENT (OUTPATIENT)
Dept: PHYSICAL THERAPY | Facility: CLINIC | Age: 79
End: 2024-07-17
Payer: MEDICARE

## 2024-07-17 DIAGNOSIS — M12.812 ROTATOR CUFF ARTHROPATHY OF BOTH SHOULDERS: Primary | ICD-10-CM

## 2024-07-17 DIAGNOSIS — M16.0 BILATERAL HIP JOINT ARTHRITIS: ICD-10-CM

## 2024-07-17 DIAGNOSIS — M12.811 ROTATOR CUFF ARTHROPATHY OF BOTH SHOULDERS: Primary | ICD-10-CM

## 2024-07-17 PROCEDURE — 97110 THERAPEUTIC EXERCISES: CPT | Mod: GP,CQ

## 2024-07-17 PROCEDURE — 97140 MANUAL THERAPY 1/> REGIONS: CPT | Mod: GP,CQ

## 2024-07-17 ASSESSMENT — PAIN - FUNCTIONAL ASSESSMENT: PAIN_FUNCTIONAL_ASSESSMENT: 0-10

## 2024-07-17 NOTE — PROGRESS NOTES
"Physical Therapy Treatment    Patient Name: Sherman Sheffield  MRN: [id number]  Today's Date: 7/17/2024  Time Calculation  Start Time: 0248  Stop Time: 0328  Time Calculation (min): 40 min     PT Therapeutic Procedures Time Entry  Manual Therapy Time Entry: 23  Therapeutic Exercise Time Entry: 17                 Insurance:   [employer] MEDICARE  No Auth Req  Visit number: 3  Approved number of visits: BMN  Assessment:   Pt presented w/ less end range tightness and deficits w/ L shoulder mobility vs R. He had more discomfort w/ R however overall was mild. Progressed his program today w/ addition of supine tband ext rotation and horiz abd bilat. Emphasis on control and end range hold. He demo'd good arben to given ex's w/ appropriate fatigue observed.  Pt's wife present for start of session however had to leave for a few min to run an errand. Encouraged cont completion of HEP. No current questions regarding program.   Plan:   Cont to progress program as arben w/ emphasis on restoring Bilat shoulder mobility, cervical mobility and improving his overall functional ability.     Current Problem  1. Rotator cuff arthropathy of both shoulders        2. Bilateral hip joint arthritis            Subjective   General  Reason for Referral: Bilateral shoulder and hip pain  Referred By: Dr. Guanakito Spring       Pain  Pain Assessment: 0-10  0-10 (Numeric) Pain Score:  (less pain treva when getting up in a.m)  Pain Location: Shoulder  Pain Orientation: Right, Left (R > L)    Objective       Treatments:  Therapeutic Exercise (25758):  -L GH joint ER and IR isometrics, 45 degrees and 90 degrees abduction: 8-3\" holds each  -Rhythmic isometrics  -Supine shoulder flexion AAROM 1lbs 2x8-12  -Supine Serratus punches 1# x10   -Sidelying shoulder abduction 2x8-10 assisted  -Seated thoracic rotation AROM with therapist OP 2x5 bilaterally  --NT  -Supine TB ER/HABD YTB x10 ea     Manual (61124): 23 min   -L shoulder PROM into flexion, ER, IR and " abduction, D2 flexion 8 minutes  -L Shoulder posterior, inferior, circumduction, and vibrational mobilizations, 8 minutes --NT  -STM through R lateral cervical and posterior cervical musculature  -R to L segmental sidegliding C3-7    EDUCATION:

## 2024-07-24 ENCOUNTER — TREATMENT (OUTPATIENT)
Dept: PHYSICAL THERAPY | Facility: CLINIC | Age: 79
End: 2024-07-24
Payer: MEDICARE

## 2024-07-24 DIAGNOSIS — M12.811 ROTATOR CUFF ARTHROPATHY OF BOTH SHOULDERS: ICD-10-CM

## 2024-07-24 DIAGNOSIS — M12.812 ROTATOR CUFF ARTHROPATHY OF BOTH SHOULDERS: ICD-10-CM

## 2024-07-24 PROCEDURE — 97140 MANUAL THERAPY 1/> REGIONS: CPT | Mod: GP,CQ

## 2024-07-24 PROCEDURE — 97110 THERAPEUTIC EXERCISES: CPT | Mod: GP,CQ

## 2024-07-24 NOTE — PROGRESS NOTES
"Physical Therapy Treatment    Patient Name: Sherman Sheffield  MRN: [id number]  Today's Date: 7/24/2024  Time Calculation  Start Time: 1447  Stop Time: 1525  Time Calculation (min): 38 min  PT Therapeutic Procedures Time Entry  Manual Therapy Time Entry: 23  Therapeutic Exercise Time Entry: 15       [employer] MEDICARE  No Auth Req  Visit number: 4  Approved number of visits: BMN    Current Problem  1. Rotator cuff arthropathy of both shoulders  Follow Up In Physical Therapy          Subjective   General   Pt. Reports he fells alright coming in.   Precautions     Pain       Objective   Treatments:   Therapeutic Exercise ([id number]):  -L GH joint ER and IR isometrics, 45 degrees and 90 degrees abduction: 8-3\" holds each  -Rhythmic isometrics  -Supine shoulder flexion AAROM 1lbs 2x10  -Supine Serratus punches 1# 2x10   -Sidelying shoulder abduction 2x8-10 assisted  -Seated thoracic rotation AROM with therapist OP 2x5 bilaterally  --NT  -Supine TB ER/HABD YTB 2x10 ea  -Standing cane Ext/IR 2x10    Manual ([id number]): 23 min   -L shoulder PROM into flexion, ER, IR and abduction, D2 flexion 23 minutes  -L Shoulder posterior, inferior, circumduction, and vibrational mobilizations, 8 minutes --NT  -STM through R lateral cervical and posterior cervical musculature -- NT  -R to L segmental sidegliding C3-7 --NT    Assessment:   Pt. PROM full in ER in B UEs, but does have limitations in Flex/Abd in B UEs d/t tightness at end ranges. Added standing cane Ext/IR for increasing mobility w/ good pt. Tolerance. Added reps to several exercises for increasing endurance. Pt. Will continue w/ current HEP.     Plan:   Continue to increase strength/mobility for performing ADLs.     OP EDUCATION:       Goals:     "

## 2024-07-31 ENCOUNTER — TREATMENT (OUTPATIENT)
Dept: PHYSICAL THERAPY | Facility: CLINIC | Age: 79
End: 2024-07-31
Payer: MEDICARE

## 2024-07-31 DIAGNOSIS — M12.811 ROTATOR CUFF ARTHROPATHY OF BOTH SHOULDERS: ICD-10-CM

## 2024-07-31 DIAGNOSIS — M12.812 ROTATOR CUFF ARTHROPATHY OF BOTH SHOULDERS: ICD-10-CM

## 2024-07-31 PROCEDURE — 97140 MANUAL THERAPY 1/> REGIONS: CPT | Mod: GP,CQ

## 2024-07-31 PROCEDURE — 97110 THERAPEUTIC EXERCISES: CPT | Mod: GP,CQ

## 2024-07-31 NOTE — PROGRESS NOTES
"Physical Therapy Treatment    Patient Name: Sherman Sheffield  MRN: [id number]  Today's Date: 7/31/2024  Time Calculation  Start Time: 1448  Stop Time: 1526  Time Calculation (min): 38 min  PT Therapeutic Procedures Time Entry  Manual Therapy Time Entry: 15  Therapeutic Exercise Time Entry: 23       [employer] MEDICARE  No Auth Req  Visit number: 5  Approved number of visits: BMN    Current Problem  1. Rotator cuff arthropathy of both shoulders  Follow Up In Physical Therapy          Subjective   General   Pt. Reports he is feeling pretty good coming in today.   Precautions     Pain       Objective   Treatments:   -L GH joint ER and IR isometrics, 45 degrees and 90 degrees abduction: 8-3\" holds each  -Rhythmic isometrics  -Supine shoulder flexion AAROM 1lbs 2x10  -Supine Serratus punches 1# 2x10   -Sidelying shoulder 4-way 2x10 B  -Seated thoracic rotation AROM with therapist OP 2x5 bilaterally  --NT  -Supine TB ER/HABD RTB 2x10 ea  -Standing cane Ext/IR 2x10  -Jobes 2x10     Manual ([id number]): 15 min   -L shoulder PROM into flexion, ER, IR and abduction, D2 flexion 15 minutes  -L Shoulder posterior, inferior, circumduction, and vibrational mobilizations --NT  -STM through R lateral cervical and posterior cervical musculature -- NT  -R to L segmental sidegliding C3-7 --NT    Assessment:   Added S/L 4-way and Jobes for increasing strength w/ good pt. Tolerance. Pt. Did not need any assistance today w/ the S/L exercises demonstrating an increase in strength since starting therapy. Pt. Will continue w/ current HEP.     Plan:   Continue w/ increasing strength/mobility.     OP EDUCATION:       Goals:     "

## 2024-08-07 ENCOUNTER — TREATMENT (OUTPATIENT)
Dept: PHYSICAL THERAPY | Facility: CLINIC | Age: 79
End: 2024-08-07
Payer: MEDICARE

## 2024-08-07 DIAGNOSIS — M12.811 ROTATOR CUFF ARTHROPATHY OF BOTH SHOULDERS: ICD-10-CM

## 2024-08-07 DIAGNOSIS — M12.812 ROTATOR CUFF ARTHROPATHY OF BOTH SHOULDERS: ICD-10-CM

## 2024-08-07 PROCEDURE — 97140 MANUAL THERAPY 1/> REGIONS: CPT | Mod: GP,CQ

## 2024-08-07 PROCEDURE — 97110 THERAPEUTIC EXERCISES: CPT | Mod: GP,CQ

## 2024-08-07 NOTE — PROGRESS NOTES
"Physical Therapy Treatment    Patient Name: Sherman Sheffield  MRN: [id number]  Today's Date: 8/7/2024  Time Calculation  Start Time: 1445  Stop Time: 1520  Time Calculation (min): 35 min  PT Therapeutic Procedures Time Entry  Manual Therapy Time Entry: 23  Therapeutic Exercise Time Entry: 10       [employer] MEDICARE  No Auth Req  Visit number: 6  Approved number of visits: BMN    Current Problem  1. Rotator cuff arthropathy of both shoulders  Follow Up In Physical Therapy          Subjective   General   Pt. Reports he is feeling good coming in today.   Precautions     Pain       Objective   Treatments:    -L GH joint ER and IR isometrics, 45 degrees and 90 degrees abduction: 8-3\" holds each  -Rhythmic isometrics  -Supine shoulder flexion AAROM 1lbs 2x10  -Supine Serratus punches 1# 2x10   -Sidelying shoulder 4-way 2x10 B  -Seated thoracic rotation AROM with therapist OP 2x5 bilaterally  --NT  -Supine TB ER/HABD RTB 2x10 ea  -Standing cane Ext/IR 2# 2x10  -Jobes 2x10  -Medball series 1# x20     Manual ([id number]): 23 min   -L shoulder PROM into flexion, ER, IR and abduction, D2 flexion 23 minutes  -L Shoulder posterior, inferior, circumduction, and vibrational mobilizations --NT  -STM through R lateral cervical and posterior cervical musculature -- NT  -R to L segmental sidegliding C3-7 --NT    Assessment:   Pt. PROM still improving w/ some tightness at flex/abd end ranges in B shoulders. Added Medball series for increasing strength w/ good pt. Tolerance. Added 2# cane to Ext/IR for increasing strength. Pt. Will continue w/ current HEP.     Plan:   Continue to increase strength/mobility for performing ADLs.     OP EDUCATION:       Goals:     "

## 2024-08-13 NOTE — PROGRESS NOTES
"Physical Therapy Treatment    Patient Name: Sherman Sheffield  MRN: [id number]  Today's Date: 8/14/2024  Time Calculation  Start Time: 1447  Stop Time: 1530  Time Calculation (min): 43 min  PT Therapeutic Procedures Time Entry  Manual Therapy Time Entry: 15  Therapeutic Exercise Time Entry: 18  Therapeutic Activity Time Entry: 10       [employer] MEDICARE  No Auth Req  Visit number: 8  Approved number of visits: BMN    Current Problem  1. Rotator cuff arthropathy of both shoulders              Subjective   General   Pt. Reports he is feeling good coming in today. Patient reports that his R shoulder will be a little achey from time to time but it is doing OK. He denies any limitations with his daily activities secondary to his R shoulder.   Precautions     Pain       Objective   Observation- unremarkable        Cervical AROM  Flexion:  70 degrees  Extension:  50 degrees  Lateral Flexion: L 45  degrees; R  35  degrees  Rotation: L 65  degrees; R 65  degrees           Shoulder PROM   Flexion: L 150  degrees ; R  145  degrees, ERP perry  ABD: L 140  degrees ; R  135  degrees, ERP perry  ER at 90 degrees abduction: L 80  degrees ; R  75  degrees  IR at 90 degrees abduction: L  55  degrees: R  35  degrees     Shoulder AROM   Flexion: L 140 degrees ; R  140  degrees. ERP  ABD: L 144  degrees; R  134  degrees, ERP  Functional IR: R- T12 L- L2     Shoulder MMT   Flex: L-  5/5 ; R-    5/5, pain  ABD: L-  5/5 ; R-   5/5, pain  IR at 0 degrees abduction: L-  5/5; R-   5/5  ER at 0 degrees abduction: L-   5/5; R-   5/5              GH Joint: 2/6 posterior and inferior glide bilaterally                             Outcome Measures:  Other Measures  Disability of Arm Shoulder Hand (DASH): Patient did not complete DASH today  Treatments:      -Rhythmic isometrics 90 degrees flexion 2x30\" 2lbs  -Supine shoulder flexion AAROM 3lbs 2x12  -Supine Serratus punches 2# 2x10   -Sidelying shoulder abduction 2x8-12 2lbs, 1lb  -Sidelying shoulder ER " 2x12 1lb  -Seated thoracic rotation AROM with therapist OP 2x5 bilaterally   -Standing Rows 2x10-12 GTB  -Straight arm pull downs 2x10 RTB  Reassessment of objective measures 10 minutes     Manual 15 minutes    -L Shoulder posterior, inferior, circumduction, and vibrational mobilizations   -STM through posterior cervical musculature      Assessment:   The patient has displayed improvement in R shoulder flexion, abduction and combined IR/extension/adduction ROM, improved global R shoulder strength and improved PROM of R shoulder flexion and abduction. The patient continues to display the following physical and functional limitations: bilateral shoulder flexion and abduction AROM, PROM R shoulder IR at 90 degrees compared to L, limitations in bilateral PROM shoulder flexion, global R GH joint stiffness, and impaired functional reaching capacity secondary to the above mentioned impairments . The patient would continue to benefit from skilled intervention to address the above mentioned impairments to improve functional capacity and QOL. The patient appeared to understand all education given and displayed verbal agreement with therapy plan of care.      Plan:   Patient and wife will call back to potentially schedule more visits, recommending 1x a week for 4 weeks     Goals:     Pt will report at least 75% improvement in  shoulder pain during everyday activities. (Progressing)  Pt will demo >/= 4+/5 strength of  shoulder musculature in all major planes without pain. (Achieved)  Pt will demo full and symmetrical AROM of cervical spine without pain. (Minimal change)  Pt will demo symmetrical A/PROM of shoulder without pain. (Progressing)  Pt will display symmetrical capacity bilaterally for reaching behind back and reaching behind head (Achieved for reaching behind back)  Pt will improve Quick DASH score by at least 20% (MCID) to indicate a significant improvement in overall function. (Not assessed today)  Pt will demo  independence and report compliance with HEP.  (Achieved but progressive)

## 2024-08-14 ENCOUNTER — TREATMENT (OUTPATIENT)
Dept: PHYSICAL THERAPY | Facility: CLINIC | Age: 79
End: 2024-08-14
Payer: MEDICARE

## 2024-08-14 DIAGNOSIS — M12.811 ROTATOR CUFF ARTHROPATHY OF BOTH SHOULDERS: Primary | ICD-10-CM

## 2024-08-14 DIAGNOSIS — M12.812 ROTATOR CUFF ARTHROPATHY OF BOTH SHOULDERS: Primary | ICD-10-CM

## 2024-08-14 PROCEDURE — 97140 MANUAL THERAPY 1/> REGIONS: CPT | Mod: GP | Performed by: PHYSICAL THERAPIST

## 2024-08-14 PROCEDURE — 97110 THERAPEUTIC EXERCISES: CPT | Mod: GP | Performed by: PHYSICAL THERAPIST

## 2024-08-14 PROCEDURE — 97530 THERAPEUTIC ACTIVITIES: CPT | Mod: GP | Performed by: PHYSICAL THERAPIST

## 2024-08-18 DIAGNOSIS — E11.9 DIABETES MELLITUS TYPE 2 WITHOUT RETINOPATHY (MULTI): ICD-10-CM

## 2024-08-19 RX ORDER — METFORMIN HYDROCHLORIDE 500 MG/1
500 TABLET ORAL
Qty: 180 TABLET | Refills: 0 | Status: SHIPPED | OUTPATIENT
Start: 2024-08-19

## 2024-11-19 ENCOUNTER — APPOINTMENT (OUTPATIENT)
Dept: PULMONOLOGY | Facility: CLINIC | Age: 79
End: 2024-11-19
Payer: MEDICARE

## 2024-11-19 VITALS
WEIGHT: 142.4 LBS | DIASTOLIC BLOOD PRESSURE: 69 MMHG | HEART RATE: 86 BPM | SYSTOLIC BLOOD PRESSURE: 110 MMHG | BODY MASS INDEX: 19.94 KG/M2 | TEMPERATURE: 97.7 F | RESPIRATION RATE: 18 BRPM | HEIGHT: 71 IN | OXYGEN SATURATION: 95 %

## 2024-11-19 DIAGNOSIS — J41.8 MIXED SIMPLE AND MUCOPURULENT CHRONIC BRONCHITIS (MULTI): Primary | ICD-10-CM

## 2024-11-19 DIAGNOSIS — R06.09 DYSPNEA ON EXERTION: ICD-10-CM

## 2024-11-19 PROCEDURE — 99214 OFFICE O/P EST MOD 30 MIN: CPT | Performed by: INTERNAL MEDICINE

## 2024-11-19 PROCEDURE — 1157F ADVNC CARE PLAN IN RCRD: CPT | Performed by: INTERNAL MEDICINE

## 2024-11-19 PROCEDURE — 1159F MED LIST DOCD IN RCRD: CPT | Performed by: INTERNAL MEDICINE

## 2024-11-19 PROCEDURE — 1126F AMNT PAIN NOTED NONE PRSNT: CPT | Performed by: INTERNAL MEDICINE

## 2024-11-19 RX ORDER — PREDNISONE 10 MG/1
TABLET ORAL
Qty: 40 EACH | Refills: 0 | Status: SHIPPED | OUTPATIENT
Start: 2024-11-19 | End: 2024-12-05

## 2024-11-19 RX ORDER — ALBUTEROL SULFATE 90 UG/1
2 INHALANT RESPIRATORY (INHALATION) EVERY 4 HOURS PRN
Qty: 18 G | Refills: 6 | Status: SHIPPED | OUTPATIENT
Start: 2024-11-19

## 2024-11-19 ASSESSMENT — COPD QUESTIONNAIRES
QUESTION1_COUGHFREQUENCY: 2
CAT_TOTALSCORE: 5
QUESTION5_HOMEACTIVITIES: 0 - I AM NOT LIMITED DOING ANY ACTIVITIES AT HOME
QUESTION3_CHESTTIGHTNESS: 0 - MY CHEST DOES NOT FEEL TIGHT AT ALL
QUESTION8_ENERGYLEVEL: 0 - I HAVE LOTS OF ENERGY
QUESTION6_LEAVINGHOUSE: 0 - I AM CONFIDENT LEAVING MY HOME DESPITE MY LUNG CONDITION
QUESTION7_SLEEPQUALITY: 0 - I SLEEP SOUNDLY
QUESTION4_WALKINCLINE: 1
QUESTION2_CHESTPHLEGM: 2

## 2024-11-19 ASSESSMENT — PATIENT HEALTH QUESTIONNAIRE - PHQ9
1. LITTLE INTEREST OR PLEASURE IN DOING THINGS: NOT AT ALL
SUM OF ALL RESPONSES TO PHQ9 QUESTIONS 1 AND 2: 0
2. FEELING DOWN, DEPRESSED OR HOPELESS: NOT AT ALL

## 2024-11-19 ASSESSMENT — ENCOUNTER SYMPTOMS
OCCASIONAL FEELINGS OF UNSTEADINESS: 0
LOSS OF SENSATION IN FEET: 0
DEPRESSION: 0

## 2024-11-19 ASSESSMENT — PAIN SCALES - GENERAL: PAINLEVEL_OUTOF10: 0-NO PAIN

## 2024-11-23 DIAGNOSIS — E11.9 DIABETES MELLITUS TYPE 2 WITHOUT RETINOPATHY (MULTI): ICD-10-CM

## 2024-11-24 PROBLEM — F02.818 LATE ONSET ALZHEIMER'S DISEASE WITH BEHAVIORAL DISTURBANCE (MULTI): Status: ACTIVE | Noted: 2023-05-22

## 2024-11-24 PROBLEM — G30.1 LATE ONSET ALZHEIMER'S DISEASE WITH BEHAVIORAL DISTURBANCE (MULTI): Status: ACTIVE | Noted: 2023-05-22

## 2024-11-24 PROBLEM — I48.92 ATRIAL FLUTTER, UNSPECIFIED TYPE (MULTI): Status: ACTIVE | Noted: 2024-11-24

## 2024-11-26 RX ORDER — METFORMIN HYDROCHLORIDE 500 MG/1
500 TABLET ORAL
Qty: 180 TABLET | Refills: 1 | Status: SHIPPED | OUTPATIENT
Start: 2024-11-26

## 2024-12-16 NOTE — PROGRESS NOTES
Department of Medicine  Division of Pulmonary, Critical Care, and Sleep Medicine  Follow-Up Visit  [facility] - [address], [address]    Physician HPI:  Mr Sheffield is an 78-year-old man with past medical history significant for nicotine dependence more than 60-pack-year history, severe COPD history of alcohol use and chronic pancreatitis who presented to clinic today for follow up.      Follow up 05/22/2019  No acute respiratory symptoms today. He has been using Advair once per day (again). No frequent need of Albuterol. He had a recent history of TIA and is following up with Neurology. Repeat chest imaging showed stable upper lobes linear scarring.      Follow up 11/11/2019  Sherman has no acute respiratory symptoms today. No interval hx of COPD exacerbations.He reports cigarette smoking about 1/2 PPD. He has been using Advair twice per day. He did not have to use ALbuterol frequently. He is planning to get flu vaccine this season.      Follow up 6/22/2020  Sherman reports stable respiratory status since last visit. He has chronic dyspnea on exertion that has not changed in the past 6 months. No acute cough or purulent sputum. Using Advair once per day again.     Follow up 02/22/2024:  Sherman presented to the clinic today accompanied by his wife. He was last seen in the office in 2019. Reports gradual decline in functional status and dyspnea on exertion over the past 2-3 years. No interval history of acute copd exacerbation  that required systemic corticosteroids or hospital admission. He is following up with Neurology for vascular dementia. Reports nasal dripping and frequent throat clearing. No acute fevers, purulent sputum, chest pain or hemoptysis.     Follow up 11/19/2024:  No acute respiratory symptoms today or interval hx of acute COPD.  We reviewed current inhaler regimen.  PFTs showed severe airflow obstruction.     Immunization History   Administered Date(s) Administered    Moderna SARS-CoV-2 Vaccination  "03/01/2021, 03/29/2021, 12/01/2021       Current Outpatient Medications   Medication Instructions    albuterol 90 mcg/actuation inhaler 2 puffs, inhalation, Every 4 hours PRN    donepezil (ARICEPT) 10 mg, Daily    fluticasone (Flonase) 50 mcg/actuation nasal spray 2 sprays, Each Nostril, Daily, Shake gently. Before first use, prime pump. After use, clean tip and replace cap.    fluticasone propion-salmeteroL (Advair Diskus) 250-50 mcg/dose diskus inhaler 1 puff, inhalation, Every 12 hours    memantine (NAMENDA) 10 mg, 2 times daily    metFORMIN (GLUCOPHAGE) 500 mg, oral, 2 times daily (morning and late afternoon)    ondansetron ODT (ZOFRAN-ODT) 4 mg, As needed    triamcinolone (Kenalog) 0.1 % cream 0.1 Applications, 2 times daily        Allergies:  No Known Allergies       Visit Vitals  /69 (BP Location: Left arm, Patient Position: Sitting, BP Cuff Size: Adult)   Pulse 86   Temp 36.5 °C (97.7 °F) (Temporal)   Resp 18   Ht 1.803 m (5' 11\")   Wt 64.6 kg (142 lb 6.4 oz)   SpO2 95%   BMI 19.86 kg/m²   Smoking Status Former   BSA 1.8 m²        Physical Exam:  Constitutional: Alert and in no acute distress. Well developed, well nourished.   Head and Face: Head and face: Normal. Palpation of the face and sinuses: Normal.   Ears, Nose, Mouth, and Throat: Oropharynx: Normal.   Pulmonary: Chest: Normal to inspection. Respiratory effort: No increased work of breathing or signs of respiratory distress. Auscultation of lungs: Clear to auscultation bilaterally.   Cardiovascular: Heart rate and rhythm were normal, normal S1 and S2, no gallops, no murmurs and no pericardial rub. No peripheral edema.   Lymphatic: No lymphadenopathy.   Musculoskeletal: No clubbing or cyanosis of the fingernails.   Skin: Normal skin color and pigmentation, normal skin turgor, and no rash.   Psychiatric: Judgment and insight: Intact. Alert and oriented to person, place and time. Mood and affect: Normal.           Chest Radiograph     XR chest 2 " views     Narrative  PERFORMED AT [facility] LOCATION:[facility]  Findings:    Osseous structures intact. Cardiopericardial silhouette normal. Pulmonary vasculature  normal. Aorta calcified and tortuous. Lungs hyperexpanded with diaphragms flattened  and retrosternal clear space increase. Thin oblique band of increased opacity coursing  from right suprahilar region into right upper lobe.    Impression  Emphysema.    Right upper lobe scarring versus subsegmental atelectatic change.  Report reported and signed by Kamari Wood on 04/25/2023 1440      XR chest 1 view 11/28/2022    Narrative  STUDY:  Chest Radiograph;  11/28/2022 5:52 PM.    INDICATION:  Chest pain.    COMPARISON:  11/27/2019 CT Chest Low Dose For Lung, 04/03/2019 Chest PA/AP Lateral.    ACCESSION NUMBER(S):  [id number]    ORDERING CLINICIAN:  KP AMARAL DO    TECHNIQUE:  Frontal chest was obtained at 1848 hours.    FINDINGS:    Emphysematous changes.  RIGHT upper lobe subsegmental atelectasis  grossly unchanged from prior.  Nodule identified on 11/27/19 CT  examination RIGHT upper lobe is not readily apparent.  No pleural  effusion.  No pneumothorax.  Heart size normal.    Impression  Emphysematous changes.    RIGHT upper lobe subsegmental atelectasis grossly unchanged from  prior.      Signed by Raul Torrez MD      Echocardiogram     No results found for this or any previous visit from the past 365 days.       Chest CT Scan   CT LUNG SCREENING LOW DOSE 11/27/2019    Narrative  MRN: [id number]  Patient Name: YISSEL GAVIN    STUDY:   CT CHEST LOW DOSE FOR LUNG SCREENING WO CONTRAST; 11/27/2019 10:34  am    INDICATION:  lung cancer screening.    COMPARISON:  CT chest dated 09/08/2017    ACCESSION NUMBER(S):  [id number]    ORDERING CLINICIAN:  LAKE TALAMANTES    TECHNIQUE:  Helical data acquisition of the chest was obtained without IV  contrast material.  Images were reformatted in axial, coronal, and  sagittal  planes.    FINDINGS:  LUNGS AND AIRWAYS:  The trachea and central airways are patent. No endobronchial lesion.  There is severe centrilobular emphysematous changes the bilateral  lungs. There is apical pleuroparenchymal scarring. There is linear  densities in the right upper lobe with mild surrounding ground-glass  opacity measuring up to 4 mm in its greatest thickness (axial image  109/335), improved compared to CT dated 09/08/2017 and represent  scarring/fibrotic changes. Band like atelectasis/scarring in the  right middle, right lower lobe and lingular lobe. No focal  consolidation, pleural effusion or pneumothorax.  1 cm nodule in the right upper lobe (axial image 161/335) is stable  compared to CT dated 09/08/2017, likely benign. There are multiple  other nodules in the bilateral lungs, stable compared to CT dated  09/08/2017. For example, a bulky rachid density in the right upper  lobe (axial image 62/335), 4 mm perifissural nodule along the right  minor fissure (axial image 198/335), 5 mm nodule in the right middle  lobe (axial image 267/335), a 7 mm pleural-based nodular density in  the right lower lobe (axial image 209/335), 5 mm nodule in the right  lower lobe (axial image 226/335), and 3 mm subpleural nodule in the  left upper lobe (axial image 89/335). Other smaller stable punctate  nodular densities are annotated on PACS.    MEDIASTINUM AND JOSE ANGEL, LOWER NECK AND AXILLA:  The visualized thyroid gland is within normal limits.  No evidence of thoracic lymphadenopathy by CT criteria.  There is small hiatal hernia.    HEART AND VESSELS:  The thoracic aorta is of normal course and caliber with severe  vascular calcifications.  Main pulmonary artery and its branches are normal in caliber.  Severe coronary artery calcifications are present.The study is not  optimized for evaluation of coronary arteries.  The cardiac chambers are not enlarged.  Trace pericardial effusion is seen.    UPPER ABDOMEN:  4 cm area of  hypodensity in the segment 4A of the liver is unchanged  compared to CT dated 09/08/2017. There is again postsurgical change  from pancreatic body resection. 2.4 cm hypodense area in the  pancreatic tail is similar compared to CT dated 09/08/2017 and  decreased compared to CT dated 04/19/2017, which may represent  pseudocyst. Small amount of perisplenic fluid is again seen.    CHEST WALL AND OSSEOUS STRUCTURES:  There are no suspicious osseous lesions. Multilevel degenerative  changes are present  There is diffuse osteopenia.    Impression  1. Linear densities with architectural distortion in the right upper  lobe measuring 4 mm in its greatest thickness, improved compared to  CT dated 09/08/2017 and likely represent residual scarring/fibrotic  changes but attention on follow-up is recommended.  2. Stable nodules in the bilateral lungs measuring up to 1 cm.  Comment follow-up chest CT in 12 months.  3. Moderate to severe upper lung predominant emphysema.  4. Severe coronary artery calcifications.  5. Redemonstration of postsurgical change in the pancreatic body and  2.4 cm hypodense area in the pancreatic tail, may represent  pseudocyst.  6. Unchanged small amount of perisplenic fluid.  7. Stable hypodense area in the segment 4A of the liver      LUNG RADS CATEGORY:  Lung-RADS 2 S(Benign Appearance or Behavior): Continue with annual  screening in 12 months recommended as per American College of  Radiology Guidelines Lung-RADS Version 1.1S modifier is for severe  coronary artery disease, although this study is not optimized to  evaluate coronary artery.    I personally reviewed the images/study and I agree with the findings  as stated. This study was interpreted at [facility], [place], [place].        Laboratory Studies     Lab Results   Component Value Date    WBC 7.3 06/05/2024    HGB 13.5 06/05/2024    HCT 42.1 06/05/2024    MCV 97 06/05/2024     06/05/2024      Lab  Results   Component Value Date    GLUCOSE 134 (H) 06/05/2024    CALCIUM 9.3 06/05/2024     06/05/2024    K 4.6 06/05/2024    CO2 25 06/05/2024     06/05/2024    BUN 26 (H) 06/05/2024    CREATININE 0.97 06/05/2024      Lab Results   Component Value Date    ALT 9 (L) 06/05/2024    AST 14 06/05/2024    ALKPHOS 72 06/05/2024    BILITOT 0.7 06/05/2024        Pulmonary Function Test         Assessment and Plan / Recommendations     Mr Whitaker is an 79-year-old man with:     1. COPD (severe) mMRC II  2. Nicotine dependence in remission   3. Lung scarring/atelectasis in upper lobes  4. Dyspnea on exertion  5. Dementia   6. Post nasal drip    Plan:  Continue on current inhaler regimen  Fluticasone nasal spray for PND  Up to date with Influenza and Pneumococcal vaccines. Advised to get RSV and COVID boosters.   Pulmonary rehab is recommended.           Follow up in 6 months, sooner if necessary.    Please excuse any misspellings or unintended errors related to the Dragon speech recognition software used to dictate this note.         Jaquelin Mcintyre MD  11/19/2024

## 2025-04-24 DIAGNOSIS — J41.8 MIXED SIMPLE AND MUCOPURULENT CHRONIC BRONCHITIS (MULTI): ICD-10-CM

## 2025-05-04 RX ORDER — FLUTICASONE PROPIONATE AND SALMETEROL 250; 50 UG/1; UG/1
1 POWDER RESPIRATORY (INHALATION) EVERY 12 HOURS
Qty: 60 EACH | Refills: 3 | Status: SHIPPED | OUTPATIENT
Start: 2025-05-04

## 2025-05-20 ENCOUNTER — APPOINTMENT (OUTPATIENT)
Dept: PULMONOLOGY | Facility: CLINIC | Age: 80
End: 2025-05-20
Payer: MEDICARE

## 2025-05-24 DIAGNOSIS — E11.9 DIABETES MELLITUS TYPE 2 WITHOUT RETINOPATHY (MULTI): ICD-10-CM

## 2025-05-27 RX ORDER — METFORMIN HYDROCHLORIDE 500 MG/1
TABLET ORAL
Qty: 180 TABLET | Refills: 0 | Status: SHIPPED | OUTPATIENT
Start: 2025-05-27

## 2025-07-29 DIAGNOSIS — J41.8 MIXED SIMPLE AND MUCOPURULENT CHRONIC BRONCHITIS (MULTI): ICD-10-CM

## 2025-08-04 DIAGNOSIS — J41.8 MIXED SIMPLE AND MUCOPURULENT CHRONIC BRONCHITIS (MULTI): ICD-10-CM

## 2025-08-04 RX ORDER — FLUTICASONE PROPIONATE AND SALMETEROL 250; 50 UG/1; UG/1
1 POWDER RESPIRATORY (INHALATION) EVERY 12 HOURS
Qty: 60 EACH | Refills: 3 | Status: SHIPPED | OUTPATIENT
Start: 2025-08-04

## 2025-08-04 RX ORDER — FLUTICASONE PROPIONATE AND SALMETEROL 250; 50 UG/1; UG/1
1 POWDER RESPIRATORY (INHALATION) EVERY 12 HOURS
Qty: 60 EACH | Refills: 3 | Status: SHIPPED | OUTPATIENT
Start: 2025-08-04 | End: 2025-08-04 | Stop reason: SDUPTHER

## 2025-08-04 NOTE — TELEPHONE ENCOUNTER
Patients wife called.    Needs Rx for Fluticasone Propion-Salmeterol.    Pended below.    Please advise.   The patient is a 37y Female complaining of headache.

## 2025-08-25 DIAGNOSIS — E11.9 DIABETES MELLITUS TYPE 2 WITHOUT RETINOPATHY (MULTI): ICD-10-CM

## 2025-08-26 RX ORDER — METFORMIN HYDROCHLORIDE 500 MG/1
TABLET ORAL
Qty: 180 TABLET | Refills: 3 | Status: SHIPPED | OUTPATIENT
Start: 2025-08-26